# Patient Record
Sex: FEMALE | Race: WHITE | NOT HISPANIC OR LATINO | Employment: STUDENT | URBAN - METROPOLITAN AREA
[De-identification: names, ages, dates, MRNs, and addresses within clinical notes are randomized per-mention and may not be internally consistent; named-entity substitution may affect disease eponyms.]

---
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
## 2022-04-23 ENCOUNTER — TELEPHONE (OUTPATIENT)
Dept: FAMILY MEDICINE CLINIC | Facility: CLINIC | Age: 22
End: 2022-04-23

## 2022-04-23 NOTE — TELEPHONE ENCOUNTER
Patient at [facility], having on-going sinus issues for over 1 week, mom is concerned it is turning into an infection, she has congestion, pressure and sounds terrible  She has upcoming finals and requesting to be treated with antibiotic  Please advise  Would like something called into [facility] on [id number] in [place]

## 2022-06-16 ENCOUNTER — OFFICE VISIT (OUTPATIENT)
Dept: PHYSICAL THERAPY | Facility: MEDICAL CENTER | Age: 22
End: 2022-06-16
Payer: COMMERCIAL

## 2022-06-16 DIAGNOSIS — M54.50 LUMBAR PAIN: Primary | ICD-10-CM

## 2022-06-16 PROCEDURE — 97161 PT EVAL LOW COMPLEX 20 MIN: CPT | Performed by: PHYSICAL MEDICINE & REHABILITATION

## 2022-06-16 PROCEDURE — 97112 NEUROMUSCULAR REEDUCATION: CPT | Performed by: PHYSICAL MEDICINE & REHABILITATION

## 2022-06-16 NOTE — PROGRESS NOTES
PT Evaluation     Today's date: 2022  Patient name: Gregory Haynes  : 2000  MRN: [id number]  Referring provider: Aldo Quesada, *  Dx:   Encounter Diagnosis     ICD-10-CM    1  Lumbar pain  M54 50                   Assessment  Assessment details: Gregory Haynes is a pleasant 25 y o  female who presents with right lumbar and hip pain  The patient's greatest concerns are worry over not knowing what's wrong and fear of not being able to keep active  No further referral appears necessary at this time based upon examination results  Primary movement impairment diagnosis of poor lumbopelvic movement coordination resulting in pathoanatomical symptoms of Lumbar pain  (primary encounter diagnosis) and limiting her ability to exercise or recreation, get out of a chair and perform household chores  She demonstrated signs and symptoms consistent with innominate rotation secondary to decreased core stability  Impairments include:  1) poor lumbopelvic movement coordination - addressing with neuromotor retraining   2) central sensitization - addressing with extensive counseling regarding pain neuroscience, diagnosis, prognosis, care options, and care planning  3) hip hypomobility - addressing with mobs and mobility exercises   4) LE neural tension - addressing with mobs and mobility exercises   5) poor lifting mechanics - addressing with functional retraining  6) transfer intolerance - addressing with functional retraining     Impairments: abnormal coordination, abnormal muscle firing, abnormal muscle tone, abnormal or restricted ROM, abnormal movement, activity intolerance, difficulty understanding, impaired physical strength, lacks appropriate home exercise program and pain with function    Symptom irritability: moderateUnderstanding of Dx/Px/POC: fair   Prognosis: good    Goals  Patient will be independent with home exercise program    Patient will be able to manage symptoms independently  Patient will be able to roll in bed without limitation due to pain  Patient will be able to get in/out of her car without limitation due to pain  Patient will be able to get in/out of bed without limitation due to pain  Patient will be able to squat to  objects from the floor without limitation due to pain  Patient will be able to lift without limitation due to pain  Plan  Plan details: Prognosis above is given PT services 2x/week tapering to 2x/month over the next 3 months and home program adherence  Patient would benefit from: skilled physical therapy  Planned modality interventions: thermotherapy: hydrocollator packs  Planned therapy interventions: abdominal trunk stabilization, activity modification, joint mobilization, manual therapy, motor coordination training, neuromuscular re-education, patient education, self care, therapeutic activities, therapeutic exercise, graded activity, home exercise program and behavior modification  Treatment plan discussed with: patient        Subjective Evaluation    History of Present Illness  Mechanism of injury: Work/School: [employer] student  Home Life: Lives with her parents  Hobbies/exercise: soccer, working out  Pain location: right superior iliac crest  HPI: Reports history of left anterior hip pain  Patient reports activities in sagittal plane are not problematic, however, weight shifting causing pain  Transfers also cause pain  Pain  Current pain ratin  At best pain ratin  At worst pain ratin    Patient Goals  Patient goal: be able to do workouts for soccer without limitations        Objective     Static Posture     Comments  L innominate posterior rotation noted  Decreased feedforward mechanism noted  Pain with transfers standing to prone  Lumbar ROM WNL, not painful  Weight shift pain noted, sight pain while walking      MMTs:  Hip flex (L1-L2): R: 4/5, L: 4/5  Knee ext (L3-L4): R: 4+/5, L: 4+/5  Knee flex (S2-S3): R: 4-/5, L: 4/5  Standing PF (S1): R: 4/5, L: 4/5  Sidelying Hip Abd (L4-S1): R: 2+/5, L: 3/5    ROM:  Hip flex (0-120): AROM: WNL  Knee Ext (0): AROM: WNL  Knee Flex (0-130): WNL  Hip ER: AROM (0-45): WNL bilaterally  Hip IR: AROM (0-45): WNL bilaterally  SLR (0-90): decreased SLR pain with external stability applied by therapist             Precautions: none      Manuals 6/16/2022                                                                Neuro Re-Ed                                                                                           HEP and Return Demo 10"            Ther Ex                                                                                                                     Ther Activity                                       Gait Training                                       Modalities

## 2022-06-22 ENCOUNTER — OFFICE VISIT (OUTPATIENT)
Dept: PHYSICAL THERAPY | Facility: MEDICAL CENTER | Age: 22
End: 2022-06-22
Payer: COMMERCIAL

## 2022-06-22 DIAGNOSIS — M54.50 LUMBAR PAIN: Primary | ICD-10-CM

## 2022-06-22 PROCEDURE — 97110 THERAPEUTIC EXERCISES: CPT | Performed by: PHYSICAL MEDICINE & REHABILITATION

## 2022-06-22 PROCEDURE — 97140 MANUAL THERAPY 1/> REGIONS: CPT | Performed by: PHYSICAL MEDICINE & REHABILITATION

## 2022-06-22 PROCEDURE — 97112 NEUROMUSCULAR REEDUCATION: CPT | Performed by: PHYSICAL MEDICINE & REHABILITATION

## 2022-06-24 ENCOUNTER — APPOINTMENT (OUTPATIENT)
Dept: PHYSICAL THERAPY | Facility: MEDICAL CENTER | Age: 22
End: 2022-06-24
Payer: COMMERCIAL

## 2022-06-24 NOTE — PROGRESS NOTES
Daily Note     Today's date: 2022  Patient name: Terry Patrick  : 2000  MRN: [id number]  Referring provider: Alona Desai, *  Dx:   Encounter Diagnosis     ICD-10-CM    1  Lumbar pain  M54 50                   Subjective: Tiffanie Darling reported "My hip was feeling a little better for a few days, but the pain started to come back "      Objective: See treatment diary below      Assessment: Tolerated treatment well  Patient would benefit from continued PT  Following the session Tiffanie Darling demonstrated a decrease in pain  She continues to get pain while transitioning from left side lying to seated  Terry Patrick was able to initiate her therapy program which shows progress towards her goals  Plan: Continue per plan of care        Precautions: none      Manuals 2022           Long axis distraction  JR           UPAs  JR           IASTM  JR                        Neuro Re-Ed             Clam Shells  3x10           Bridges  3x10           Palloff Presses  NV           Planks  NV           Sidelying Hip Abd  3" 3x10                        HEP and Return Demo 10"            Ther Ex                                                                                                                     Ther Activity                                       Gait Training                                       Modalities

## 2022-06-30 ENCOUNTER — OFFICE VISIT (OUTPATIENT)
Dept: PHYSICAL THERAPY | Facility: MEDICAL CENTER | Age: 22
End: 2022-06-30
Payer: COMMERCIAL

## 2022-06-30 DIAGNOSIS — M54.50 LUMBAR PAIN: Primary | ICD-10-CM

## 2022-06-30 PROCEDURE — 97112 NEUROMUSCULAR REEDUCATION: CPT | Performed by: PHYSICAL MEDICINE & REHABILITATION

## 2022-06-30 PROCEDURE — 97140 MANUAL THERAPY 1/> REGIONS: CPT | Performed by: PHYSICAL MEDICINE & REHABILITATION

## 2022-06-30 PROCEDURE — 97110 THERAPEUTIC EXERCISES: CPT | Performed by: PHYSICAL MEDICINE & REHABILITATION

## 2022-06-30 NOTE — PROGRESS NOTES
Daily Note     Today's date: 2022  Patient name: Jas Vieyra  : 2000  MRN: [id number]  Referring provider: Talita Lang, *  Dx:   Encounter Diagnosis     ICD-10-CM    1  Lumbar pain  M54 50                   Subjective: Sudeep Offer reported "After the last visit I felt sore for two days and then after that I haven't really had much pain "      Objective: See treatment diary below      Assessment: Tolerated treatment well  Patient would benefit from continued PT  Following the session Sudeep Offer denied pain with getting off of the table, her concordant sign, she did however, report slight pain while lowering to the table  Plan: Continue per plan of care        Precautions: none      Manuals 2022          Long axis distraction  JR JR          UPAs  JR JR          IASTM  JR JR                       Neuro Re-Ed             Clam Shells  3x10           Bridges  3x10 3x10          Palloff Presses  NV           Planks  NV           Sidelying Hip Abd  3" 3x10 3" 3x10                       HEP and Return Demo 10"            Ther Ex                                                                                                                     Ther Activity                                       Gait Training                                       Modalities

## 2022-07-12 ENCOUNTER — OFFICE VISIT (OUTPATIENT)
Dept: FAMILY MEDICINE CLINIC | Facility: CLINIC | Age: 22
End: 2022-07-12
Payer: COMMERCIAL

## 2022-07-12 VITALS
SYSTOLIC BLOOD PRESSURE: 108 MMHG | HEIGHT: 64 IN | BODY MASS INDEX: 19.12 KG/M2 | DIASTOLIC BLOOD PRESSURE: 76 MMHG | RESPIRATION RATE: 16 BRPM | WEIGHT: 112 LBS | TEMPERATURE: 98 F | HEART RATE: 80 BPM | OXYGEN SATURATION: 99 %

## 2022-07-12 DIAGNOSIS — F41.0 ANXIETY ATTACK: ICD-10-CM

## 2022-07-12 DIAGNOSIS — Z00.00 ROUTINE ADULT HEALTH MAINTENANCE: Primary | ICD-10-CM

## 2022-07-12 DIAGNOSIS — Z11.3 SCREENING FOR STDS (SEXUALLY TRANSMITTED DISEASES): ICD-10-CM

## 2022-07-12 DIAGNOSIS — Z11.4 SCREENING FOR HIV (HUMAN IMMUNODEFICIENCY VIRUS): ICD-10-CM

## 2022-07-12 DIAGNOSIS — Z11.59 NEED FOR HEPATITIS C SCREENING TEST: ICD-10-CM

## 2022-07-12 DIAGNOSIS — Z30.41 ENCOUNTER FOR SURVEILLANCE OF CONTRACEPTIVE PILLS: ICD-10-CM

## 2022-07-12 DIAGNOSIS — J30.9 ALLERGIC RHINITIS, UNSPECIFIED SEASONALITY, UNSPECIFIED TRIGGER: ICD-10-CM

## 2022-07-12 PROCEDURE — 99395 PREV VISIT EST AGE 18-39: CPT | Performed by: FAMILY MEDICINE

## 2022-07-12 RX ORDER — MONTELUKAST SODIUM 10 MG/1
10 TABLET ORAL DAILY
Qty: 90 TABLET | Refills: 1 | Status: SHIPPED | OUTPATIENT
Start: 2022-07-12

## 2022-07-12 RX ORDER — HYDROXYZINE PAMOATE 50 MG/1
50 CAPSULE ORAL 3 TIMES DAILY PRN
Qty: 30 CAPSULE | Refills: 1 | Status: SHIPPED | OUTPATIENT
Start: 2022-07-12

## 2022-07-12 NOTE — ASSESSMENT & PLAN NOTE
Relatively infrequent use of hydroxyzine; refilled medication; discussed daily medication such as zoloft which she will consider as she admits to more regular anxiety and constant worrying but defers today; encouraged continued counseling; f/u guidance given

## 2022-07-12 NOTE — PROGRESS NOTES
[address] [facility]    NAME: Mitzy Brand  AGE: 25 y o  SEX: female  : 2000     DATE: 2022     Assessment and Plan:     Problem List Items Addressed This Visit        Respiratory    Allergic rhinitis     C/w singular           Relevant Medications    montelukast (SINGULAIR) 10 mg tablet       Other    Routine adult health maintenance - Primary     Advised on diet and exercise; UTD on vaccines; advised on STD screening; has dental home; reviewed preventative measures           Anxiety attack     Relatively infrequent use of hydroxyzine; refilled medication; discussed daily medication such as zoloft which she will consider as she admits to more regular anxiety and constant worrying but defers today; encouraged continued counseling; f/u guidance given           Relevant Medications    hydrOXYzine pamoate (VISTARIL) 50 mg capsule    Encounter for surveillance of contraceptive pills     Reviewed OCP use             Other Visit Diagnoses     Need for hepatitis C screening test        Relevant Orders    Hepatitis C Antibody ([facility])    Screening for STDs (sexually transmitted diseases)        Relevant Orders    Chlamydia/GC amplified DNA by PCR    Screening for HIV (human immunodeficiency virus)        Relevant Orders    HIV 1/2 Antigen/Antibody (4th Generation) w Reflex [facility]          Immunizations and preventive care screenings were discussed with patient today  Appropriate education was printed on patient's after visit summary  Counseling:  Alcohol/drug use: discussed moderation in alcohol intake, the recommendations for healthy alcohol use, and avoidance of illicit drug use  Dental Health: discussed importance of regular tooth brushing, flossing, and dental visits    Sexual health: discussed sexually transmitted diseases, partner selection, use of condoms, avoidance of unintended pregnancy, and contraceptive alternatives  · Exercise: the importance of regular exercise/physical activity was discussed  Recommend exercise 3-5 times per week for at least 30 minutes  Depression Screening and Follow-up Plan: Patient was screened for depression during today's encounter  They screened negative with a PHQ-2 score of 0  Return in about 1 year (around 7/12/2023) for Annual physical      Chief Complaint:     Chief Complaint   Patient presents with    Physical Exam      History of Present Illness:     Adult Annual Physical   Patient here for a comprehensive physical exam  The patient reports problems - f/u on anxiety and allergies  Anxiety: Pt uses hydroxyzine  Pt states if she is going away from home or if overstimulated gets anxious and will need to take it  Admits to struggling somewhat with anxiety  Using hydroxyzine about 2 times a month  No SI  She feels she worries a lot  Plays soccer at [employer]  Feels anxious during school year due to social stresses  Admits to some health anxiety  Does therapy as needed  OCP: Sexually active with one partner  Compliant with medication  Helps with acne which is why she originally went on it  No headaches with auras  No Fhx of blood clots  Uses singulair for allergies usually takes in fall starting in August  Helps control symptoms  Uses albuterol as needed for exercise  Otherwise asthma is currently controlled  Diet and Physical Activity  · Diet/Nutrition: well balanced diet  · Exercise: 5-7 times a week on average  Depression Screening  PHQ-2/9 Depression Screening    Little interest or pleasure in doing things: 0 - not at all  Feeling down, depressed, or hopeless: 0 - not at all  PHQ-2 Score: 0  PHQ-2 Interpretation: Negative depression screen       General Health  · Sleep: sleeps well  · Hearing: normal - bilateral   · Vision: goes for regular eye exams and wears contacts  · Dental: regular dental visits         /GYN Health  · Patient is: premenopausal  · Contraceptive method: oral contraceptives  Review of Systems:     Review of Systems   Constitutional: Negative for chills and fever  Respiratory: Negative for cough, chest tightness and wheezing  Cardiovascular: Negative for chest pain, palpitations and leg swelling  Gastrointestinal: Negative for abdominal pain  Neurological: Negative for dizziness  Psychiatric/Behavioral: Positive for self-injury  Negative for sleep disturbance and suicidal ideas  The patient is nervous/anxious         Past Medical History:     Past Medical History:   Diagnosis Date    Ocular migraine     last assessed: 3/24/2016      Past Surgical History:     Past Surgical History:   Procedure Laterality Date    HERNIA REPAIR      INCISION AND DRAINAGE INTRA ORAL ABSCESS Bilateral 3/15/2020    Procedure: INCISION AND DRAINAGE  (I&D) WOUND ORAL RIGHT  SPACE LEFT  SPACE;  Surgeon: Margi Xavier DMD;  [facility];  Service: Maxillofacial    WOUND DEBRIDEMENT Bilateral 3/15/2020    Procedure: DEBRIDEMENT and washout bilateral oral wound;  Surgeon: Margi Xavier DMD;  [facility];  Service: Maxillofacial      Social History:     Social History     Socioeconomic History    Marital status: Single     Spouse name: None    Number of children: None    Years of education: None    Highest education level: None   Occupational History    None   Tobacco Use    Smoking status: Never Smoker    Smokeless tobacco: Never Used   Vaping Use    Vaping Use: Never used   Substance and Sexual Activity    Alcohol use: No    Drug use: No    Sexual activity: None   Other Topics Concern    None   Social History Narrative    Always uses seat belt    Feels safe at home     Social Determinants of Health     Financial Resource Strain: Not on file   Food Insecurity: Not on file   Transportation Needs: Not on file   Physical Activity: Not on file   Stress: Not on file   Social Connections: Not on file   Intimate Partner Violence: Not on file   Housing Stability: Not on file      Family History:     Family History   Problem Relation Age of Onset    Diabetes type I Mother         mellitus    Diabetes Mother         mellitus    Heart disease Maternal Grandfather     Diabetes type II Maternal Grandfather     Lung cancer Maternal Grandfather     Asthma Father       Current Medications:     Current Outpatient Medications   Medication Sig Dispense Refill    albuterol (PROVENTIL HFA,VENTOLIN HFA) 90 mcg/act inhaler Inhale 2 puffs every 6 (six) hours as needed for wheezing or shortness of breath 18 g 1    hydrOXYzine pamoate (VISTARIL) 50 mg capsule Take 1 capsule (50 mg total) by mouth 3 (three) times a day as needed for anxiety 30 capsule 1    montelukast (SINGULAIR) 10 mg tablet Take 1 tablet (10 mg total) by mouth daily 90 tablet 1    Tri-Sprintec 0 18/0 215/0 25 MG-35 MCG per tablet TAKE ONE TABLET BY MOUTH EVERY DAY 84 tablet 0     No current facility-administered medications for this visit  Allergies: Allergies   Allergen Reactions    Amoxicillin Rash    Doxycycline Other (See Comments)     Pt states she gets flush       Physical Exam:     /76 (BP Location: Left arm, Patient Position: Sitting, Cuff Size: Adult)   Pulse 80   Temp 98 °F (36 7 °C) (Temporal)   Resp 16   Ht 5' 4" (1 626 m)   Wt 50 8 kg (112 lb)   LMP 06/11/2022 (Exact Date)   SpO2 99%   BMI 19 22 kg/m²     Physical Exam  Vitals reviewed  Constitutional:       General: She is not in acute distress  Appearance: Normal appearance  She is normal weight  She is not ill-appearing or toxic-appearing  HENT:      Head: Normocephalic and atraumatic  Right Ear: Tympanic membrane, ear canal and external ear normal  There is no impacted cerumen  Left Ear: Tympanic membrane, ear canal and external ear normal  There is no impacted cerumen        Nose: Nose normal       Mouth/Throat: Mouth: Mucous membranes are moist       Pharynx: No oropharyngeal exudate or posterior oropharyngeal erythema  Eyes:      General: No scleral icterus  Left eye: No discharge  Conjunctiva/sclera: Conjunctivae normal       Pupils: Pupils are equal, round, and reactive to light  Neck:      Thyroid: No thyroid mass, thyromegaly or thyroid tenderness  Cardiovascular:      Rate and Rhythm: Normal rate and regular rhythm  Heart sounds: Normal heart sounds  No murmur heard  Pulmonary:      Effort: Pulmonary effort is normal  No respiratory distress  Breath sounds: Normal breath sounds  No wheezing, rhonchi or rales  Abdominal:      General: Abdomen is flat  Palpations: There is no mass  Tenderness: There is no abdominal tenderness  There is no guarding  Hernia: No hernia is present  Musculoskeletal:         General: No swelling  Cervical back: Neck supple  No muscular tenderness  Lymphadenopathy:      Cervical: No cervical adenopathy  Skin:     Findings: No rash  Neurological:      Mental Status: She is alert and oriented to person, place, and time  Psychiatric:         Mood and Affect: Mood normal          Behavior: Behavior normal          Thought Content:  Thought content normal          Judgment: Judgment normal           Courtney Newsome, DO  [facility]

## 2022-07-12 NOTE — ASSESSMENT & PLAN NOTE
Advised on diet and exercise; UTD on vaccines; advised on STD screening; has dental home; reviewed preventative measures

## 2022-07-14 ENCOUNTER — OFFICE VISIT (OUTPATIENT)
Dept: PHYSICAL THERAPY | Facility: MEDICAL CENTER | Age: 22
End: 2022-07-14
Payer: COMMERCIAL

## 2022-07-14 DIAGNOSIS — M54.50 LUMBAR PAIN: Primary | ICD-10-CM

## 2022-07-14 PROCEDURE — 97112 NEUROMUSCULAR REEDUCATION: CPT | Performed by: PHYSICAL MEDICINE & REHABILITATION

## 2022-07-14 PROCEDURE — 97140 MANUAL THERAPY 1/> REGIONS: CPT | Performed by: PHYSICAL MEDICINE & REHABILITATION

## 2022-07-14 PROCEDURE — 97110 THERAPEUTIC EXERCISES: CPT | Performed by: PHYSICAL MEDICINE & REHABILITATION

## 2022-07-14 NOTE — PROGRESS NOTES
Daily Note     Today's date: 2022  Patient name: Adela Churchill  : 2000  MRN: [id number]  Referring provider: Teto Tinsley, *  Dx:   Encounter Diagnosis     ICD-10-CM    1  Lumbar pain  M54 50                   Subjective: Teofilo Beltran reported "I am doing pretty well, I am able to workout more with less pain, but I am having some pain "      Objective: See treatment diary below      Assessment: Tolerated treatment well  Patient would benefit from continued PT  Teofilo Beltran was able to progress her therapy program as seen below which shows progress towards her goals  Plan: Continue per plan of care        Precautions: none      Manuals 2022         Long axis distraction  JR JR          UPAs  JR JR JR         IASTM  JR JR JR                      Neuro Re-Ed             Clam Shells  3x10  3x10         Bridges  3x10 3x10 20# 3x10         Palloff Presses  NV           Planks  NV           Sidelying Hip Abd  3" 3x10 3" 3x10          Reverse Clam Shells    3x10         HEP and Return Demo 10"            Ther Ex             Monster walks    Black 3x6         X-walks    Black 3x6         SL step downs    3x10x2                                                                          Ther Activity                                       Gait Training                                       Modalities

## 2022-07-19 ENCOUNTER — OFFICE VISIT (OUTPATIENT)
Dept: PHYSICAL THERAPY | Facility: MEDICAL CENTER | Age: 22
End: 2022-07-19
Payer: COMMERCIAL

## 2022-07-19 ENCOUNTER — TELEPHONE (OUTPATIENT)
Dept: FAMILY MEDICINE CLINIC | Facility: CLINIC | Age: 22
End: 2022-07-19

## 2022-07-19 DIAGNOSIS — Z13.0 SCREENING FOR SICKLE-CELL DISEASE OR TRAIT: Primary | ICD-10-CM

## 2022-07-19 DIAGNOSIS — M54.50 LUMBAR PAIN: Primary | ICD-10-CM

## 2022-07-19 PROCEDURE — 97140 MANUAL THERAPY 1/> REGIONS: CPT | Performed by: PHYSICAL MEDICINE & REHABILITATION

## 2022-07-19 PROCEDURE — 97110 THERAPEUTIC EXERCISES: CPT | Performed by: PHYSICAL MEDICINE & REHABILITATION

## 2022-07-19 PROCEDURE — 97112 NEUROMUSCULAR REEDUCATION: CPT | Performed by: PHYSICAL MEDICINE & REHABILITATION

## 2022-07-19 NOTE — TELEPHONE ENCOUNTER
Pt's mom LM stating she received paperwork from [facility] and states she needs a sickle cell screening  Would like to know if you will place this order so she can have this done

## 2022-07-19 NOTE — PROGRESS NOTES
PT Discharge    Today's date: 2022  Patient name: Robert Whitehead  : 2000  MRN: [id number]  Referring provider: Jim Bowles, *  Dx:   Encounter Diagnosis     ICD-10-CM    1  Lumbar pain  M54 50                   Assessment  Assessment details: Robert Whitehead is a pleasant 25 y o  female who presents with right lumbar and hip pain  Robert Whitehead has made excellent progress towards her goals and has demonstrated independence with her HEP  Due to her progress towards her goals, her independence with her HEP and her reported self-efficacy with her HEP she is being D/Darrian to her HEP at this time  No further referral appears necessary at this time based upon examination results  Primary movement impairment diagnosis of poor lumbopelvic movement coordination resulting in pathoanatomical symptoms of Lumbar pain  (primary encounter diagnosis) and limiting her ability to exercise or recreation, get out of a chair and perform household chores  She demonstrated signs and symptoms consistent with innominate rotation secondary to decreased core stability       Impairments include:  1) poor lumbopelvic movement coordination - addressing with neuromotor retraining   2) central sensitization - addressing with extensive counseling regarding pain neuroscience, diagnosis, prognosis, care options, and care planning  3) hip hypomobility - addressing with mobs and mobility exercises   4) LE neural tension - addressing with mobs and mobility exercises   5) poor lifting mechanics - addressing with functional retraining  6) transfer intolerance - addressing with functional retraining     Impairments: abnormal coordination, abnormal muscle firing, abnormal muscle tone, abnormal or restricted ROM, abnormal movement, activity intolerance, difficulty understanding, impaired physical strength, lacks appropriate home exercise program and pain with function    Symptom irritability: moderateUnderstanding of Dx/Px/POC: fair   Prognosis: good    Goals  Patient will be independent with home exercise program  - met  Patient will be able to manage symptoms independently  - met  Patient will be able to roll in bed without limitation due to pain  - met  Patient will be able to get in/out of her car without limitation due to pain  - met  Patient will be able to get in/out of bed without limitation due to pain  - met  Patient will be able to squat to  objects from the floor without limitation due to pain  - met  Patient will be able to lift without limitation due to pain  - met    Plan  Patient would benefit from: skilled physical therapy  Planned modality interventions: thermotherapy: hydrocollator packs  Planned therapy interventions: abdominal trunk stabilization, activity modification, joint mobilization, manual therapy, motor coordination training, neuromuscular re-education, patient education, self care, therapeutic activities, therapeutic exercise, graded activity, home exercise program and behavior modification  Treatment plan discussed with: patient        Subjective Evaluation    History of Present Illness  Mechanism of injury: Work/School: [employer] student  Home Life: Lives with her parents  Hobbies/exercise: soccer, working out  Pain location: right superior iliac crest  HPI: Reports history of left anterior hip pain  Patient reports activities in sagittal plane are not problematic, however, weight shifting causing pain  Transfers also cause pain  Pain  Current pain ratin  At best pain ratin  At worst pain ratin    Patient Goals  Patient goal: be able to do workouts for soccer without limitations        Objective     Static Posture     Comments  L innominate posterior rotation noted  No rotation noted on re-eval    Decreased feedforward mechanism noted  Pain with transfers standing to prone  Lumbar ROM WNL, not painful  Weight shift pain noted, sight pain while walking      MMTs:  Hip flex (L1-L2): R: 4+/5, L: 4+/5  Knee ext (L3-L4): R: 4+/5, L: 4+/5  Knee flex (S2-S3): R: 4+/5, L: 4+/5  Standing PF (S1): R: 4/5, L: 4/5  Sidelying Hip Abd (L4-S1): R: 3+/5, L: 3+/5    ROM:  Hip flex (0-120): AROM: WNL  Knee Ext (0): AROM: WNL  Knee Flex (0-130): WNL  Hip ER: AROM (0-45): WNL bilaterally  Hip IR: AROM (0-45): WNL bilaterally  SLR (0-90): decreased SLR pain with external stability applied by therapist             Precautions: none      Manuals 6/16/2022 6/22/2022 6/30/2022 7/14/2022 7/19/2022        Long axis distraction  JR JR          UPAs  JR JR JR JR        IASTM  Lana JR                     Neuro Re-Ed             Clam Shells  3x10  3x10 3x10        Bridges  3x10 3x10 20# 3x10 20# 3x10        Palloff Presses  NV   Grn 3x10        Planks  NV           Sidelying Hip Abd  3" 3x10 3" 3x10          Reverse Clam Shells    3x10 3x10        HEP and Return Demo 10"            Ther Ex             Monster walks    Black 3x6 Black 3x8        X-walks    Black 3x6 Black 3x8        SL step downs    3x10x2 3x10x2                                                                         Ther Activity                                       Gait Training                                       Modalities

## 2022-07-19 NOTE — TELEPHONE ENCOUNTER
Called pt and informed her provider placed lab order as requested  Pt verbalized understanding and is agreeable

## 2022-07-27 ENCOUNTER — APPOINTMENT (OUTPATIENT)
Dept: LAB | Facility: MEDICAL CENTER | Age: 22
End: 2022-07-27
Payer: COMMERCIAL

## 2022-07-27 DIAGNOSIS — Z11.59 NEED FOR HEPATITIS C SCREENING TEST: ICD-10-CM

## 2022-07-27 DIAGNOSIS — Z11.4 SCREENING FOR HIV (HUMAN IMMUNODEFICIENCY VIRUS): ICD-10-CM

## 2022-07-27 DIAGNOSIS — Z11.3 SCREENING FOR STDS (SEXUALLY TRANSMITTED DISEASES): ICD-10-CM

## 2022-07-27 DIAGNOSIS — Z13.0 SCREENING FOR SICKLE-CELL DISEASE OR TRAIT: ICD-10-CM

## 2022-07-27 PROCEDURE — 85660 RBC SICKLE CELL TEST: CPT

## 2022-07-27 PROCEDURE — 87591 N.GONORRHOEAE DNA AMP PROB: CPT

## 2022-07-27 PROCEDURE — 86803 HEPATITIS C AB TEST: CPT

## 2022-07-27 PROCEDURE — 87389 HIV-1 AG W/HIV-1&-2 AB AG IA: CPT

## 2022-07-27 PROCEDURE — 87491 CHLMYD TRACH DNA AMP PROBE: CPT

## 2022-07-27 PROCEDURE — 36415 COLL VENOUS BLD VENIPUNCTURE: CPT

## 2022-07-28 LAB
C TRACH DNA SPEC QL NAA+PROBE: NEGATIVE
HCV AB SER QL: NORMAL
HIV 1+2 AB+HIV1 P24 AG SERPL QL IA: NORMAL
N GONORRHOEA DNA SPEC QL NAA+PROBE: NEGATIVE

## 2022-07-29 LAB — SICKLE CELLS BLD QL SMEAR: NEGATIVE

## 2022-09-06 DIAGNOSIS — L70.9 ACNE, UNSPECIFIED ACNE TYPE: ICD-10-CM

## 2022-09-06 RX ORDER — NORGESTIMATE AND ETHINYL ESTRADIOL 7DAYSX3 28
KIT ORAL
Qty: 84 TABLET | Refills: 0 | Status: SHIPPED | OUTPATIENT
Start: 2022-09-06

## 2022-10-11 PROBLEM — Z00.00 ROUTINE ADULT HEALTH MAINTENANCE: Status: RESOLVED | Noted: 2022-07-12 | Resolved: 2022-10-11

## 2022-10-12 PROBLEM — J01.10 ACUTE NON-RECURRENT FRONTAL SINUSITIS: Status: RESOLVED | Noted: 2021-05-10 | Resolved: 2022-10-12

## 2023-01-05 ENCOUNTER — APPOINTMENT (OUTPATIENT)
Dept: LAB | Facility: CLINIC | Age: 23
End: 2023-01-05

## 2023-01-05 DIAGNOSIS — Z11.1 TUBERCULOSIS SCREENING: ICD-10-CM

## 2023-01-09 LAB
GAMMA INTERFERON BACKGROUND BLD IA-ACNC: 0.04 IU/ML
M TB IFN-G BLD-IMP: NEGATIVE
M TB IFN-G CD4+ BCKGRND COR BLD-ACNC: 0.01 IU/ML
M TB IFN-G CD4+ BCKGRND COR BLD-ACNC: 0.01 IU/ML
MITOGEN IGNF BCKGRD COR BLD-ACNC: >10 IU/ML

## 2023-05-08 ENCOUNTER — OFFICE VISIT (OUTPATIENT)
Dept: FAMILY MEDICINE CLINIC | Facility: CLINIC | Age: 23
End: 2023-05-08

## 2023-05-08 VITALS
DIASTOLIC BLOOD PRESSURE: 62 MMHG | TEMPERATURE: 98.5 F | BODY MASS INDEX: 18.39 KG/M2 | HEIGHT: 65 IN | HEART RATE: 113 BPM | WEIGHT: 110.4 LBS | OXYGEN SATURATION: 99 % | SYSTOLIC BLOOD PRESSURE: 100 MMHG

## 2023-05-08 DIAGNOSIS — J02.9 PHARYNGITIS, UNSPECIFIED ETIOLOGY: Primary | ICD-10-CM

## 2023-05-08 RX ORDER — AZITHROMYCIN 250 MG/1
TABLET, FILM COATED ORAL
Qty: 6 TABLET | Refills: 0 | Status: SHIPPED | OUTPATIENT
Start: 2023-05-08 | End: 2023-05-13

## 2023-05-08 NOTE — PROGRESS NOTES
"Name: Chasidy Dennis      : 2000      MRN: [id number]  Encounter Provider: Brad Crespo DO  Encounter Date: 2023   Encounter department: [address] [place] [address]  Pharyngitis, unspecified etiology  -     azithromycin (Zithromax) 250 mg tablet; Take 2 tablets (500 mg total) by mouth daily for 1 day, THEN 1 tablet (250 mg total) daily for 4 days  Subjective      1 week history of sore throat with occasional postnasal drainage and cough  Low-grade fever  Review of Systems   Constitutional: Negative  HENT: Positive for congestion, postnasal drip and sore throat  Respiratory: Negative  Cardiovascular: Negative  Gastrointestinal: Negative  Genitourinary: Negative  Musculoskeletal: Negative  Psychiatric/Behavioral: Negative  Current Outpatient Medications on File Prior to Visit   Medication Sig   • albuterol (PROVENTIL HFA,VENTOLIN HFA) 90 mcg/act inhaler Inhale 2 puffs every 6 (six) hours as needed for wheezing or shortness of breath   • hydrOXYzine pamoate (VISTARIL) 50 mg capsule Take 1 capsule (50 mg total) by mouth 3 (three) times a day as needed for anxiety   • montelukast (SINGULAIR) 10 mg tablet Take 1 tablet (10 mg total) by mouth daily   • Tri-Sprintec 0 18/0 215/0 25 MG-35 MCG per tablet TAKE ONE TABLET BY MOUTH EVERY DAY       Objective     /62 (BP Location: Right arm, Patient Position: Sitting, Cuff Size: Adult)   Pulse (!) 113   Temp 98 5 °F (36 9 °C)   Ht 5' 5\" (1 651 m)   Wt 50 1 kg (110 lb 6 4 oz)   LMP 2023 (Approximate)   SpO2 99%   BMI 18 37 kg/m²     Physical Exam  Vitals and nursing note reviewed  Constitutional:       General: She is not in acute distress  Appearance: She is well-developed  She is not diaphoretic  HENT:      Head: Normocephalic and atraumatic  Mouth/Throat:      Pharynx: Posterior oropharyngeal erythema present   No pharyngeal swelling or oropharyngeal " exudate  Eyes:      General:         Right eye: No discharge  Conjunctiva/sclera: Conjunctivae normal       Pupils: Pupils are equal, round, and reactive to light  Neck:      Thyroid: No thyromegaly  Cardiovascular:      Rate and Rhythm: Normal rate and regular rhythm  Pulmonary:      Effort: Pulmonary effort is normal  No respiratory distress  Breath sounds: Normal breath sounds  Musculoskeletal:      Cervical back: Normal range of motion  Lymphadenopathy:      Cervical: No cervical adenopathy  Skin:     General: Skin is warm and dry  Neurological:      Mental Status: She is alert and oriented to person, place, and time  Psychiatric:         Behavior: Behavior normal          Thought Content:  Thought content normal          Judgment: Judgment normal        Viji Easton DO

## 2023-05-20 ENCOUNTER — NURSE TRIAGE (OUTPATIENT)
Dept: FAMILY MEDICINE CLINIC | Facility: CLINIC | Age: 23
End: 2023-05-20

## 2023-05-20 DIAGNOSIS — L70.9 ACNE, UNSPECIFIED ACNE TYPE: ICD-10-CM

## 2023-05-20 RX ORDER — NORGESTIMATE AND ETHINYL ESTRADIOL 7DAYSX3 28
1 KIT ORAL DAILY
Qty: 84 TABLET | Refills: 0 | Status: SHIPPED | OUTPATIENT
Start: 2023-05-20

## 2023-05-20 NOTE — TELEPHONE ENCOUNTER
Mom of patient calling today stating patient just moved back home from college and lost her Tri-Sprintec prescription  She stated the patient needs this medication this weekend and requests a refill be sent in to the [facility] on [address]  On call provider contacted, gave verbal order to send refill into patient's designated pharmacy  Mom made aware and verbalized understanding

## 2023-05-20 NOTE — TELEPHONE ENCOUNTER
"  Reason for Disposition  • [1] Prescription refill request for ESSENTIAL medicine (i e , likelihood of harm to patient if not taken) AND [2] triager unable to refill per department policy    Answer Assessment - Initial Assessment Questions  1  DRUG NAME: \"What medicine do you need to have refilled? \"      Tri-sprintec    2  REFILLS REMAINING: \"How many refills are remaining? \" (Note: The label on the medicine or pill bottle will show how many refills are remaining  If there are no refills remaining, then a renewal may be needed  )      1- patient lost the medication during the move back home from [facility]    3  EXPIRATION DATE: Bryanna Bruner is the expiration date? \" (Note: The label states when the prescription will , and thus can no longer be refilled )      N/a     4  PRESCRIBING HCP: \"Who prescribed it? \" Reason: If prescribed by specialist, call should be referred to that group        PCP    Protocols used: MEDICATION REFILL AND RENEWAL CALL-ADULT-    "

## 2023-05-20 NOTE — TELEPHONE ENCOUNTER
"Regarding: misplaced medication  ----- Message from Natasha Nguyen sent at 5/20/2023  9:18 AM EDT -----  \" My daughter just moved back home from college and cannot find her medication, so I wanted to know if another prescription can be sent to the [facility] in [facility] for her?\"    "

## 2023-06-01 DIAGNOSIS — L70.9 ACNE, UNSPECIFIED ACNE TYPE: ICD-10-CM

## 2023-06-02 RX ORDER — NORGESTIMATE AND ETHINYL ESTRADIOL 7DAYSX3 28
KIT ORAL
Qty: 84 TABLET | Refills: 0 | Status: SHIPPED | OUTPATIENT
Start: 2023-06-02 | End: 2023-06-08 | Stop reason: SDUPTHER

## 2023-06-06 DIAGNOSIS — L70.9 ACNE, UNSPECIFIED ACNE TYPE: ICD-10-CM

## 2023-06-07 RX ORDER — NORGESTIMATE AND ETHINYL ESTRADIOL 7DAYSX3 28
1 KIT ORAL DAILY
Qty: 84 TABLET | Refills: 0 | OUTPATIENT
Start: 2023-06-07

## 2023-06-08 DIAGNOSIS — L70.9 ACNE, UNSPECIFIED ACNE TYPE: ICD-10-CM

## 2023-06-09 DIAGNOSIS — J30.9 ALLERGIC RHINITIS, UNSPECIFIED SEASONALITY, UNSPECIFIED TRIGGER: ICD-10-CM

## 2023-06-09 RX ORDER — NORGESTIMATE AND ETHINYL ESTRADIOL 7DAYSX3 28
1 KIT ORAL DAILY
Qty: 84 TABLET | Refills: 0 | Status: SHIPPED | OUTPATIENT
Start: 2023-06-09

## 2023-06-09 RX ORDER — MONTELUKAST SODIUM 10 MG/1
TABLET ORAL
Qty: 90 TABLET | Refills: 0 | Status: SHIPPED | OUTPATIENT
Start: 2023-06-09

## 2023-06-13 DIAGNOSIS — J30.9 ALLERGIC RHINITIS, UNSPECIFIED SEASONALITY, UNSPECIFIED TRIGGER: ICD-10-CM

## 2023-06-13 RX ORDER — MONTELUKAST SODIUM 10 MG/1
10 TABLET ORAL DAILY
Qty: 90 TABLET | Refills: 0 | OUTPATIENT
Start: 2023-06-13

## 2023-08-17 DIAGNOSIS — L70.9 ACNE, UNSPECIFIED ACNE TYPE: ICD-10-CM

## 2023-08-17 RX ORDER — NORGESTIMATE AND ETHINYL ESTRADIOL 7DAYSX3 28
1 KIT ORAL DAILY
Qty: 84 TABLET | Refills: 0 | Status: SHIPPED | OUTPATIENT
Start: 2023-08-17

## 2023-10-13 DIAGNOSIS — R06.2 WHEEZING: ICD-10-CM

## 2023-10-16 NOTE — TELEPHONE ENCOUNTER
Called pt to schedule annual physical. Pt declined to schedule at call adding that she would call back to schedule when she has her calendar with her.

## 2023-10-16 NOTE — TELEPHONE ENCOUNTER
Last give 1 year ago, pt has no chronic diagnosis on file to warrant long term use. Pt overdue for physical. Will leave with provider to determine appropriateness of refill.

## 2023-10-17 RX ORDER — ALBUTEROL SULFATE 90 UG/1
2 AEROSOL, METERED RESPIRATORY (INHALATION) EVERY 6 HOURS PRN
Qty: 18 G | Refills: 0 | Status: SHIPPED | OUTPATIENT
Start: 2023-10-17

## 2023-11-20 DIAGNOSIS — L70.9 ACNE, UNSPECIFIED ACNE TYPE: ICD-10-CM

## 2023-11-21 RX ORDER — NORGESTIMATE AND ETHINYL ESTRADIOL 7DAYSX3 28
1 KIT ORAL DAILY
Qty: 84 TABLET | Refills: 0 | Status: SHIPPED | OUTPATIENT
Start: 2023-11-21

## 2024-02-23 DIAGNOSIS — L70.9 ACNE, UNSPECIFIED ACNE TYPE: ICD-10-CM

## 2024-02-23 RX ORDER — NORGESTIMATE AND ETHINYL ESTRADIOL 7DAYSX3 28
1 KIT ORAL DAILY
Qty: 84 TABLET | Refills: 0 | Status: SHIPPED | OUTPATIENT
Start: 2024-02-23

## 2024-04-30 ENCOUNTER — TELEMEDICINE (OUTPATIENT)
Dept: FAMILY MEDICINE CLINIC | Facility: CLINIC | Age: 24
End: 2024-04-30
Payer: COMMERCIAL

## 2024-04-30 DIAGNOSIS — J01.90 ACUTE NON-RECURRENT SINUSITIS, UNSPECIFIED LOCATION: Primary | ICD-10-CM

## 2024-04-30 DIAGNOSIS — J34.89 NASAL SORE: ICD-10-CM

## 2024-04-30 PROCEDURE — 99213 OFFICE O/P EST LOW 20 MIN: CPT

## 2024-04-30 RX ORDER — AZITHROMYCIN 250 MG/1
TABLET, FILM COATED ORAL DAILY
Qty: 6 TABLET | Refills: 0 | Status: SHIPPED | OUTPATIENT
Start: 2024-04-30 | End: 2024-05-05

## 2024-04-30 NOTE — PROGRESS NOTES
Virtual Regular Visit    Verification of patient location:    Patient is located at Home in the following state in which I hold an active license [place]      Assessment/Plan:    Problem List Items Addressed This Visit       Acute non-recurrent sinusitis - Primary     Patient with signs, symptoms, and exam findings suggestive of an acute sinusitis. Symptoms have been present for over 7 days. Discussed treatment with Z-pack x 5 days due to patient's allergies to PCN and doxy, which patient is agreeable with. Discussed combining this with sinus rinses, such as NeilMed brand. Discussed they may continue to use OTC cold medicines to treat her symptoms. Recommended follow up if the course of antibiotics does not help. Patient already taking Singulair for asthma/allergies. Patient agreeable to return to care if symptoms persist. All questions answered. Agreeable with plan today and will follow up as needed.          Relevant Medications    azithromycin (Zithromax) 250 mg tablet    Nasal sore     Nasal sores unable to be assessed on today's visit. She has tried Neosporin and vaseline without success. She states her nose still feels irritated. Will provide Bactroban ointment to use in the nose at least twice a day. If her symptoms do not improve, would recommend f/u in office for assessment.          Relevant Medications    mupirocin (BACTROBAN) 2 % ointment            Reason for visit is   Chief Complaint   Patient presents with    Virtual Regular Visit          Encounter provider Padmini De La O PA-C      Recent Visits  No visits were found meeting these conditions.  Showing recent visits within past 7 days and meeting all other requirements  Today's Visits  Date Type Provider Dept   04/30/24 Telemedicine Padmini De La O PA-C [facility]   Showing today's visits and meeting all other requirements  Future Appointments  No visits were found meeting these conditions.  Showing future appointments within next 150  days and meeting all other requirements       The patient was identified by name and date of birth. Mckenna Warren was informed that this is a telemedicine visit and that the visit is being conducted through the Epic Embedded platform. She agrees to proceed..  My office door was closed. No one else was in the room.  She acknowledged consent and understanding of privacy and security of the video platform. The patient has agreed to participate and understands they can discontinue the visit at any time.    Patient is aware this is a billable service.     Subjective  Mckenna Warren is a 23 y.o. female presents via telemedicine with concerns of a possible sinus infection .      Patient presents today with congestion for the past 3 weeks along with some ear pain and a minor sore throat.  She states her symptoms became a little bit better last week however then they became much worse.  She states she is having a bad smell and she has green mucus whenever she blows her nose.  She has been using Advil Cold and Sinus and Zicam nose spray sparingly she also feels like her nose is scabbed inside she has tried Vaseline and Neosporin without success.  She has had no fever or chills.         Past Medical History:   Diagnosis Date    Ocular migraine     last assessed: 3/24/2016       Past Surgical History:   Procedure Laterality Date    HERNIA REPAIR      INCISION AND DRAINAGE INTRA ORAL ABSCESS Bilateral 3/15/2020    Procedure: INCISION AND DRAINAGE  (I&D) WOUND ORAL RIGHT  SPACE LEFT  SPACE;  Surgeon: Gila Moralez DMD;  Location: [facility];  Service: Maxillofacial    WOUND DEBRIDEMENT Bilateral 3/15/2020    Procedure: DEBRIDEMENT and washout bilateral oral wound;  Surgeon: Gila Moralez DMD;  Location: [facility];  Service: Maxillofacial       Current Outpatient Medications   Medication Sig Dispense Refill    azithromycin (Zithromax) 250 mg tablet Take 2 tablets (500 mg total) by mouth daily  for 1 day, THEN 1 tablet (250 mg total) daily for 4 days. 6 tablet 0    mupirocin (BACTROBAN) 2 % ointment Apply topically 3 (three) times a day 22 g 0    albuterol (PROVENTIL HFA,VENTOLIN HFA) 90 mcg/act inhaler Inhale 2 puffs every 6 (six) hours as needed for wheezing or shortness of breath 18 g 0    hydrOXYzine pamoate (VISTARIL) 50 mg capsule Take 1 capsule (50 mg total) by mouth 3 (three) times a day as needed for anxiety 30 capsule 1    montelukast (SINGULAIR) 10 mg tablet TAKE ONE TABLET BY MOUTH EVERY DAY 90 tablet 0    norgestimate-ethinyl estradiol (Tri-Sprintec) 0.18/0.215/0.25 MG-35 MCG per tablet Take 1 tablet by mouth daily 84 tablet 0     No current facility-administered medications for this visit.        Allergies   Allergen Reactions    Amoxicillin Rash    Doxycycline Other (See Comments)     Pt states she gets flush        Review of Systems   Constitutional:  Negative for chills, fatigue and fever.   HENT:  Positive for congestion, ear pain, sinus pressure and sore throat. Negative for postnasal drip, sinus pain and trouble swallowing.         Nasal sores   Respiratory:  Negative for shortness of breath.    Cardiovascular:  Negative for chest pain.   Neurological:  Negative for dizziness, light-headedness and headaches.       Video Exam    There were no vitals filed for this visit.    Physical Exam  Constitutional:       General: She is not in acute distress.     Appearance: Normal appearance. She is not ill-appearing or diaphoretic.   HENT:      Head: Normocephalic and atraumatic.      Nose:      Comments: Sounds congested on exam today  Unable to assess nasal sores due to video quality  Pulmonary:      Effort: Pulmonary effort is normal. No respiratory distress.   Skin:     Coloration: Skin is not cyanotic or pale.   Neurological:      General: No focal deficit present.      Mental Status: She is alert and oriented to person, place, and time.   Psychiatric:         Mood and Affect: Mood normal.          Behavior: Behavior normal. Behavior is cooperative.         Judgment: Judgment normal.          Visit Time  Total Visit Duration: 20

## 2024-04-30 NOTE — ASSESSMENT & PLAN NOTE
Nasal sores unable to be assessed on today's visit. She has tried Neosporin and vaseline without success. She states her nose still feels irritated. Will provide Bactroban ointment to use in the nose at least twice a day. If her symptoms do not improve, would recommend f/u in office for assessment.

## 2024-04-30 NOTE — ASSESSMENT & PLAN NOTE
Patient with signs, symptoms, and exam findings suggestive of an acute sinusitis. Symptoms have been present for over 7 days. Discussed treatment with Z-pack x 5 days due to patient's allergies to PCN and doxy, which patient is agreeable with. Discussed combining this with sinus rinses, such as NeilMed brand. Discussed they may continue to use OTC cold medicines to treat her symptoms. Recommended follow up if the course of antibiotics does not help. Patient already taking Singulair for asthma/allergies. Patient agreeable to return to care if symptoms persist. All questions answered. Agreeable with plan today and will follow up as needed.

## 2024-05-15 DIAGNOSIS — L70.9 ACNE, UNSPECIFIED ACNE TYPE: ICD-10-CM

## 2024-05-16 RX ORDER — NORGESTIMATE AND ETHINYL ESTRADIOL 7DAYSX3 28
1 KIT ORAL DAILY
Qty: 84 TABLET | Refills: 0 | Status: SHIPPED | OUTPATIENT
Start: 2024-05-16

## 2024-05-20 ENCOUNTER — TELEPHONE (OUTPATIENT)
Dept: FAMILY MEDICINE CLINIC | Facility: CLINIC | Age: 24
End: 2024-05-20

## 2024-05-20 NOTE — TELEPHONE ENCOUNTER
Pt's mother dropped off form to be signed by Dr. Knowles. She asked that she get a call when it is completed. I already attached updated vaccine reports

## 2024-05-21 NOTE — TELEPHONE ENCOUNTER
Spoke with mom to let her know that Mckenna's paperwork has been completed and ready for  at the .

## 2024-05-30 PROBLEM — J01.90 ACUTE NON-RECURRENT SINUSITIS: Status: RESOLVED | Noted: 2024-04-30 | Resolved: 2024-05-30

## 2024-06-12 ENCOUNTER — NURSE TRIAGE (OUTPATIENT)
Age: 24
End: 2024-06-12

## 2024-06-12 NOTE — TELEPHONE ENCOUNTER
"Pt new to practice, has a visit to establish care next week, but calling with concerns for worsening symptoms of a swollen lump/lymph node in R armpit. States noted it in April and gave it time, hoping it would go away, however, noted the last couple of days worsening swelling and slight tenderness. Pt states node is closer to breast tissue than to upper armpit area. Denies redness, scratch/sore, fever. RN able to schedule a problem visit tomorrow afternoon for further evaluation of swollen lump. Pt agreeable to plan. No further questions.     Reason for Disposition   Large node present > 2 weeks    Answer Assessment - Initial Assessment Questions  1. LOCATION: \"Where is the swollen node located?\" \"Is the matching node on the other side of the body also swollen?\"       Right side armpit, lower area closer to breast tissue  2. SIZE: \"How big is the node?\" (Inches or centimeters) (or compare to common objects such as pea, bean, marble, golf ball)       [place]  3. ONSET: \"When did the swelling start?\"       End of April   4. NECK NODES: \"Is there a sore throat, runny nose or other symptoms of a cold?\"       Denies  5. GROIN OR ARMPIT NODES: \"Is there a sore, scratch, cut or painful red area on that arm or leg?\"       Denies  6. FEVER: \"Do you have a fever?\" If Yes, ask: \"What is it, how was it measured, and when did it start?\"       Denies  7. CAUSE: \"What do you think is causing the swollen lymph nodes?\"      Unsure  8. OTHER SYMPTOMS: \"Do you have any other symptoms?\"      Slightly tender to touch  9. PREGNANCY: \"Is there any chance you are pregnant?\" \"When was your last menstrual period?\"      Denies    Protocols used: Lymph Nodes - Swollen-ADULT-[place]    "

## 2024-06-12 NOTE — TELEPHONE ENCOUNTER
Regarding: new pt and has lump near armpit  ----- Message from Vani MORALES sent at 6/12/2024 11:45 AM EDT -----  This is a new patient and she is scheduled next week and called to see if she can come in sooner due to having a lump near her armpit.  Please call her back at [phone]. Thank you

## 2024-06-13 ENCOUNTER — OFFICE VISIT (OUTPATIENT)
Dept: OBGYN CLINIC | Facility: MEDICAL CENTER | Age: 24
End: 2024-06-13
Payer: COMMERCIAL

## 2024-06-13 VITALS
SYSTOLIC BLOOD PRESSURE: 126 MMHG | BODY MASS INDEX: 18.32 KG/M2 | HEIGHT: 64 IN | WEIGHT: 107.3 LBS | DIASTOLIC BLOOD PRESSURE: 80 MMHG

## 2024-06-13 DIAGNOSIS — N63.31 MASS OF AXILLARY TAIL OF RIGHT BREAST: Primary | ICD-10-CM

## 2024-06-13 PROCEDURE — 99202 OFFICE O/P NEW SF 15 MIN: CPT | Performed by: ADVANCED PRACTICE MIDWIFE

## 2024-06-13 NOTE — PROGRESS NOTES
"[facility] [address] Nilda Owens PA    Assessment/Plan:  No problem-specific Assessment & Plan notes found for this encounter.    Diagnoses and all orders for this visit:    Mass of axillary tail of right breast  -     US breast right limited (diagnostic); Future        Subjective:   Mckenna Warren is a 23 y.o.  female.  CC: breast lump    HPI: Mckenna states that she has had a lump since April and feels that it is getting larger. She notes that there is no pain, no nipple discharge.  LMP 2024. Has been on birth control pill for 6 yrs.   No increase in caffeine, salt, alcohol,   No trauma to the breast  No weight gain or weight loss.   Notes that past 2 weeks it was sore, but notes armpit to be more tender.          ROS: Review of Systems   Constitutional:  Negative for chills, fatigue and fever.   Respiratory:  Negative for cough and shortness of breath.    Cardiovascular:  Negative for chest pain and palpitations.       PFSH: The following portions of the patient's history were reviewed and updated as appropriate: allergies, current medications, past family history, past medical history, obstetric history, gynecologic history, past social history, past surgical history and problem list.       Objective:  /80   Ht 5' 4\" (1.626 m)   Wt 48.7 kg (107 lb 4.8 oz)   LMP 2024 (Exact Date)   BMI 18.42 kg/m²    Physical Exam  Chest:          Comments: 1 cm dense mobile tissue, non tender         "

## 2024-06-17 ENCOUNTER — OFFICE VISIT (OUTPATIENT)
Dept: OBGYN CLINIC | Facility: MEDICAL CENTER | Age: 24
End: 2024-06-17
Payer: COMMERCIAL

## 2024-06-17 ENCOUNTER — HOSPITAL ENCOUNTER (OUTPATIENT)
Dept: ULTRASOUND IMAGING | Facility: CLINIC | Age: 24
Discharge: HOME | End: 2024-06-17
Payer: COMMERCIAL

## 2024-06-17 VITALS
SYSTOLIC BLOOD PRESSURE: 106 MMHG | HEIGHT: 64 IN | DIASTOLIC BLOOD PRESSURE: 70 MMHG | WEIGHT: 107.9 LBS | BODY MASS INDEX: 18.42 KG/M2

## 2024-06-17 VITALS — BODY MASS INDEX: 18.27 KG/M2 | WEIGHT: 107 LBS | HEIGHT: 64 IN

## 2024-06-17 DIAGNOSIS — L70.9 ACNE, UNSPECIFIED ACNE TYPE: ICD-10-CM

## 2024-06-17 DIAGNOSIS — Z12.4 PAP SMEAR FOR CERVICAL CANCER SCREENING: Primary | ICD-10-CM

## 2024-06-17 DIAGNOSIS — N63.31 MASS OF AXILLARY TAIL OF RIGHT BREAST: ICD-10-CM

## 2024-06-17 PROCEDURE — 76642 ULTRASOUND BREAST LIMITED: CPT

## 2024-06-17 PROCEDURE — S0612 ANNUAL GYNECOLOGICAL EXAMINA: HCPCS | Performed by: OBSTETRICS & GYNECOLOGY

## 2024-06-17 PROCEDURE — G0145 SCR C/V CYTO,THINLAYER,RESCR: HCPCS | Performed by: OBSTETRICS & GYNECOLOGY

## 2024-06-17 RX ORDER — NORGESTIMATE AND ETHINYL ESTRADIOL 7DAYSX3 28
1 KIT ORAL DAILY
Qty: 84 TABLET | Refills: 3 | Status: SHIPPED | OUTPATIENT
Start: 2024-06-17

## 2024-06-20 LAB
LAB AP GYN PRIMARY INTERPRETATION: NORMAL
Lab: NORMAL

## 2024-06-26 ENCOUNTER — OFFICE VISIT (OUTPATIENT)
Dept: PHYSICAL THERAPY | Facility: MEDICAL CENTER | Age: 24
End: 2024-06-26
Payer: COMMERCIAL

## 2024-06-26 DIAGNOSIS — M54.2 CERVICALGIA: Primary | ICD-10-CM

## 2024-06-26 PROCEDURE — 97161 PT EVAL LOW COMPLEX 20 MIN: CPT | Performed by: PHYSICAL THERAPIST

## 2024-06-26 NOTE — PROGRESS NOTES
PT Evaluation     Today's date: 2024  Patient name: Mckenna Warren  : 2000  MRN: [id number]  Referring provider: Alexander Mares, PT  Dx:   Encounter Diagnosis     ICD-10-CM    1. Cervicalgia  M54.2                      Assessment  Impairments: abnormal muscle firing, abnormal muscle tone, abnormal or restricted ROM, impaired physical strength, lacks appropriate home exercise program and pain with function    Assessment details: Mckenna Warren is a 23 y.o. female presenting to therapy with complaints of neck pain.   Clinical exam consistent with upper cervical hypomobility resulting in neck pain with associated cervicogenic headaches.    Skilled therapy indicated for manual mobilizations of upper cervical spine for 2-4 weeks.    Understanding of Dx/Px/POC: good     Prognosis: good    Goals  Patient will successfully transition to home exercise program.  Patient will be able to manage symptoms independently.    Mckenna will report resolution of headaches  Mckenna will report no neck pain at end of work day       Plan  Patient would benefit from: skilled PT  Referral necessary: No  Planned modality interventions: thermotherapy: hydrocollator packs    Planned therapy interventions: home exercise program, manual therapy, neuromuscular re-education, patient education, functional ROM exercises, strengthening, stretching, joint mobilization, graded activity, graded exercise, therapeutic exercise, body mechanics training, motor coordination training and activity modification    Frequency: 1x week  Duration in weeks: 12  Treatment plan discussed with: patient        Subjective Evaluation    History of Present Illness  Mechanism of injury: Mckenna Warren is a 23 y.o. female presenting to therapy with complaints of neck pain. She notes pain began around May, located in upper part of neck associated with headaches as well.  She notes pain tends to worsen by  day along with the headaches.  Denies any  numbness or tingling.  Tried occasional chiropractic with some short term relief.   Recently graduated, beginning Med school in one month.    Patient Goals  Patient goals for therapy: decreased pain, increased motion, return to sport/leisure activities, independence with ADLs/IADLs and increased strength    Pain  Current pain ratin  At best pain ratin  At worst pain ratin  Quality: dull ache, discomfort, pressure and tight          Objective  Red Flags:  None present  Observation: Posture WNL   Reflexes:  WNL bilaterally   AROM:  ROM near full in all directions, upper cervical rotation hypomobility bilaterally   PAIVM:   Hypomobility and comparable sign with C1/2 UPA bilaterally   Palpation:  Increased tonicity to bilateral upper trapezius with trigger points    Thoracic:  Hypomobility throughout upper thoracic spine into extension          Precautions: None      Manuals             Prone UPA C1/2                                                   Neuro Re-Ed                                                                                                        Ther Ex             Cervical snags                                                                                                        Ther Activity                                       Gait Training                                       Modalities

## 2024-06-28 NOTE — PROGRESS NOTES
A/P    1.  Annual exam    Last PAP - never   Next due today    Scheduling of pap discussed in detail     2.  Birth control    All options were discussed and she wishes to stay on her pills     Refilled tri sprintec       23 y.o.,Patient's last menstrual period was 06/12/2024 (exact date).  C/O no gyn concerns doing well       Past medical / social / surgical / family history reviewed and updated   Medication and allergies discussed in detail and updated     Review of Systems - History obtained from chart review and the patient  General ROS: negative  Psychological ROS: negative  Ophthalmic ROS: negative  ENT ROS: negative  Allergy and Immunology ROS: negative  Hematological and Lymphatic ROS: negative  Endocrine ROS: negative  Breast ROS: negative for breast lumps  Respiratory ROS: no cough, shortness of breath, or wheezing  Cardiovascular ROS: no chest pain or dyspnea on exertion  Gastrointestinal ROS: no abdominal pain, change in bowel habits, or black or bloody stools  Genito-Urinary ROS: no dysuria, trouble voiding, or hematuria  Musculoskeletal ROS: negative  Neurological ROS: no TIA or stroke symptoms  Dermatological ROS: negative        Physical Exam  Vitals reviewed. Exam conducted with a chaperone present.   Constitutional:       Appearance: She is well-developed.   Neck:      Thyroid: No thyromegaly.   Cardiovascular:      Rate and Rhythm: Normal rate.      Heart sounds: Normal heart sounds.   Pulmonary:      Effort: Pulmonary effort is normal. No accessory muscle usage or respiratory distress.      Breath sounds: Normal air entry.   Chest:      Chest wall: No tenderness.   Breasts:     Breasts are symmetrical.      Right: No inverted nipple, mass or tenderness.      Left: No inverted nipple, mass or tenderness.   Abdominal:      General: There is no distension.      Palpations: Abdomen is soft.      Tenderness: There is no abdominal tenderness. There is no right CVA tenderness, left CVA tenderness,  guarding or rebound.   Genitourinary:     General: Normal vulva.      Exam position: Lithotomy position.      Labia:         Right: No rash, tenderness, lesion or injury.         Left: No rash, tenderness, lesion or injury.       Vagina: Normal. No foreign body. No vaginal discharge or bleeding.      Cervix: Normal.      Uterus: Normal. Not enlarged and not fixed.       Adnexa: Right adnexa normal and left adnexa normal.        Right: No mass, tenderness or fullness.          Left: No mass, tenderness or fullness.        Rectum: No external hemorrhoid.   Musculoskeletal:      Cervical back: Normal range of motion.   Lymphadenopathy:      Cervical: No cervical adenopathy.      Upper Body:      Right upper body: No supraclavicular adenopathy.      Left upper body: No supraclavicular adenopathy.   Neurological:      Mental Status: She is alert and oriented to person, place, and time.   Psychiatric:         Speech: Speech normal.         Behavior: Behavior normal.         Thought Content: Thought content normal.         Judgment: Judgment normal.

## 2024-07-03 ENCOUNTER — OFFICE VISIT (OUTPATIENT)
Dept: PHYSICAL THERAPY | Facility: MEDICAL CENTER | Age: 24
End: 2024-07-03
Payer: COMMERCIAL

## 2024-07-03 DIAGNOSIS — M54.2 CERVICALGIA: Primary | ICD-10-CM

## 2024-07-03 PROCEDURE — 97140 MANUAL THERAPY 1/> REGIONS: CPT | Performed by: PHYSICAL THERAPIST

## 2024-07-03 PROCEDURE — 97110 THERAPEUTIC EXERCISES: CPT | Performed by: PHYSICAL THERAPIST

## 2024-07-03 NOTE — PROGRESS NOTES
Daily Note     Today's date: 7/3/2024  Patient name: Mckenna Warren  : 2000  MRN: [id number]  Referring provider: Alexander Mares, PT  Dx:   Encounter Diagnosis     ICD-10-CM    1. Cervicalgia  M54.2                      Subjective: Mckenna reports that she is doing well, was sore after evaluation for a period but then neck and low back did feel better.  She got a half foam for home       Objective: See treatment diary below      Assessment: Tolerated treatment well. Patient with continued upper cervical and lower lumbar mobility restrictions, improved post mobilizations.  Will use half foam for self mobility, consider massage as well       Plan: Continue per plan of care.      Precautions: None      Manuals 7/3            Prone UPA C1/2            Prone lumbar PA L5                                      Neuro Re-Ed                                                                                                        Ther Ex             Cervical snags             LTR on half foam             SKC half foam             Cervical roation on half foam                                                                  Ther Activity                                       Gait Training                                       Modalities

## 2024-07-10 ENCOUNTER — OFFICE VISIT (OUTPATIENT)
Dept: FAMILY MEDICINE CLINIC | Facility: CLINIC | Age: 24
End: 2024-07-10
Payer: COMMERCIAL

## 2024-07-10 VITALS
TEMPERATURE: 97.7 F | OXYGEN SATURATION: 99 % | SYSTOLIC BLOOD PRESSURE: 100 MMHG | DIASTOLIC BLOOD PRESSURE: 80 MMHG | HEIGHT: 64 IN | HEART RATE: 84 BPM | BODY MASS INDEX: 18.57 KG/M2 | WEIGHT: 108.8 LBS

## 2024-07-10 DIAGNOSIS — J30.9 ALLERGIC RHINITIS, UNSPECIFIED SEASONALITY, UNSPECIFIED TRIGGER: ICD-10-CM

## 2024-07-10 DIAGNOSIS — Z23 ENCOUNTER FOR IMMUNIZATION: ICD-10-CM

## 2024-07-10 DIAGNOSIS — F41.0 ANXIETY ATTACK: ICD-10-CM

## 2024-07-10 DIAGNOSIS — Z00.00 ANNUAL PHYSICAL EXAM: Primary | ICD-10-CM

## 2024-07-10 DIAGNOSIS — R06.2 WHEEZING: ICD-10-CM

## 2024-07-10 PROCEDURE — 99395 PREV VISIT EST AGE 18-39: CPT | Performed by: FAMILY MEDICINE

## 2024-07-10 PROCEDURE — 90471 IMMUNIZATION ADMIN: CPT

## 2024-07-10 PROCEDURE — 90715 TDAP VACCINE 7 YRS/> IM: CPT

## 2024-07-10 RX ORDER — MONTELUKAST SODIUM 10 MG/1
10 TABLET ORAL DAILY
Qty: 90 TABLET | Refills: 1 | Status: SHIPPED | OUTPATIENT
Start: 2024-07-10

## 2024-07-10 RX ORDER — ALBUTEROL SULFATE 90 UG/1
2 AEROSOL, METERED RESPIRATORY (INHALATION) EVERY 6 HOURS PRN
Qty: 18 G | Refills: 0 | Status: SHIPPED | OUTPATIENT
Start: 2024-07-10

## 2024-07-10 RX ORDER — HYDROXYZINE PAMOATE 50 MG/1
50 CAPSULE ORAL 3 TIMES DAILY PRN
Qty: 30 CAPSULE | Refills: 0 | Status: SHIPPED | OUTPATIENT
Start: 2024-07-10

## 2024-07-10 NOTE — PROGRESS NOTES
Adult Annual Physical  Name: Mckenna Warren      : 2000      MRN: [id number]  Encounter Provider: Sharon Knowles DO  Encounter Date: 7/10/2024   Encounter department: [facility]    Assessment & Plan   1. Annual physical exam  2. Encounter for immunization  -     TDAP VACCINE GREATER THAN OR EQUAL TO 8YO IM  3. Wheezing  -     albuterol (PROVENTIL HFA,VENTOLIN HFA) 90 mcg/act inhaler; Inhale 2 puffs every 6 (six) hours as needed for wheezing or shortness of breath  4. Allergic rhinitis, unspecified seasonality, unspecified trigger  -     montelukast (SINGULAIR) 10 mg tablet; Take 1 tablet (10 mg total) by mouth daily  5. Anxiety attack  -     hydrOXYzine pamoate (VISTARIL) 50 mg capsule; Take 1 capsule (50 mg total) by mouth 3 (three) times a day as needed for anxiety    Immunizations and preventive care screenings were discussed with patient today. Appropriate education was printed on patient's after visit summary.    Counseling:  Alcohol/drug use: discussed moderation in alcohol intake, the recommendations for healthy alcohol use, and avoidance of illicit drug use.  Dental Health: discussed importance of regular tooth brushing, flossing, and dental visits.  Exercise: the importance of regular exercise/physical activity was discussed. Recommend exercise 3-5 times per week for at least 30 minutes.       Depression Screening and Follow-up Plan: Patient was screened for depression during today's encounter. They screened negative with a PHQ-2 score of 0.        History of Present Illness     Adult Annual Physical:  Patient presents for annual physical. Patient is here for physical.  She starts med school next month. [employer].  Was seen at GYN last month. Benign axillary lymph node..     Depression Screening:  - PHQ-2 Score: 0    Review of Systems   Constitutional:  Negative for chills and fever.   HENT:  Positive for rhinorrhea. Negative for congestion and sore throat.    Respiratory:  Positive  "for wheezing. Negative for chest tightness.    Cardiovascular:  Negative for chest pain and palpitations.   Gastrointestinal:  Negative for abdominal pain, constipation, diarrhea and nausea.   Genitourinary:  Negative for difficulty urinating.   Skin: Negative.    Neurological:  Negative for dizziness and headaches.   Psychiatric/Behavioral: Negative.           Objective     /80 (BP Location: Left arm, Patient Position: Sitting, Cuff Size: Adult)   Pulse 84   Temp 97.7 °F (36.5 °C)   Ht 5' 4\" (1.626 m)   Wt 49.4 kg (108 lb 12.8 oz)   LMP 07/10/2024   SpO2 99%   BMI 18.68 kg/m²     Physical Exam  Vitals and nursing note reviewed.   Constitutional:       General: She is not in acute distress.     Appearance: She is well-developed.   HENT:      Head: Normocephalic.      Right Ear: Tympanic membrane normal.      Left Ear: Tympanic membrane normal.      Mouth/Throat:      Pharynx: No posterior oropharyngeal erythema.   Eyes:      General: No scleral icterus.  Neck:      Thyroid: No thyromegaly.      Vascular: No carotid bruit.   Cardiovascular:      Rate and Rhythm: Normal rate and regular rhythm.      Heart sounds: Normal heart sounds. No murmur heard.  Pulmonary:      Effort: Pulmonary effort is normal.      Breath sounds: Normal breath sounds.   Abdominal:      General: Bowel sounds are normal.      Palpations: Abdomen is soft.   Musculoskeletal:      Right lower leg: No edema.      Left lower leg: No edema.   Lymphadenopathy:      Cervical: No cervical adenopathy.   Skin:     General: Skin is warm and dry.   Neurological:      Mental Status: She is alert and oriented to person, place, and time.   Psychiatric:         Mood and Affect: Mood normal.         "

## 2024-10-03 ENCOUNTER — LAB (OUTPATIENT)
Dept: LAB | Facility: CLINIC | Age: 24
End: 2024-10-03
Payer: COMMERCIAL

## 2024-10-03 ENCOUNTER — OFFICE VISIT (OUTPATIENT)
Dept: FAMILY MEDICINE CLINIC | Facility: CLINIC | Age: 24
End: 2024-10-03
Payer: COMMERCIAL

## 2024-10-03 VITALS
WEIGHT: 103 LBS | DIASTOLIC BLOOD PRESSURE: 70 MMHG | HEART RATE: 87 BPM | SYSTOLIC BLOOD PRESSURE: 102 MMHG | HEIGHT: 64 IN | TEMPERATURE: 98.2 F | BODY MASS INDEX: 17.58 KG/M2 | OXYGEN SATURATION: 98 %

## 2024-10-03 DIAGNOSIS — M25.50 ARTHRALGIA, UNSPECIFIED JOINT: ICD-10-CM

## 2024-10-03 DIAGNOSIS — R21 RASH: Primary | ICD-10-CM

## 2024-10-03 DIAGNOSIS — R21 RASH: ICD-10-CM

## 2024-10-03 DIAGNOSIS — I73.00 RAYNAUD'S PHENOMENON WITHOUT GANGRENE: ICD-10-CM

## 2024-10-03 DIAGNOSIS — R76.8 ANA POSITIVE: Primary | ICD-10-CM

## 2024-10-03 DIAGNOSIS — Z23 IMMUNIZATION DUE: ICD-10-CM

## 2024-10-03 LAB
ALBUMIN SERPL BCG-MCNC: 4.3 G/DL (ref 3.5–5)
ALP SERPL-CCNC: 40 U/L (ref 34–104)
ALT SERPL W P-5'-P-CCNC: 14 U/L (ref 7–52)
ANA SER QL IA: POSITIVE
ANION GAP SERPL CALCULATED.3IONS-SCNC: 10 MMOL/L (ref 4–13)
AST SERPL W P-5'-P-CCNC: 19 U/L (ref 13–39)
B BURGDOR IGG+IGM SER QL IA: NEGATIVE
BASOPHILS # BLD AUTO: 0.05 THOUSANDS/ΜL (ref 0–0.1)
BASOPHILS NFR BLD AUTO: 1 % (ref 0–1)
BILIRUB SERPL-MCNC: 0.68 MG/DL (ref 0.2–1)
BUN SERPL-MCNC: 15 MG/DL (ref 5–25)
CALCIUM SERPL-MCNC: 9.7 MG/DL (ref 8.4–10.2)
CHLORIDE SERPL-SCNC: 101 MMOL/L (ref 96–108)
CO2 SERPL-SCNC: 28 MMOL/L (ref 21–32)
CREAT SERPL-MCNC: 0.75 MG/DL (ref 0.6–1.3)
CRP SERPL QL: 3.5 MG/L
EOSINOPHIL # BLD AUTO: 0.1 THOUSAND/ΜL (ref 0–0.61)
EOSINOPHIL NFR BLD AUTO: 2 % (ref 0–6)
ERYTHROCYTE [DISTWIDTH] IN BLOOD BY AUTOMATED COUNT: 11.4 % (ref 11.6–15.1)
ERYTHROCYTE [SEDIMENTATION RATE] IN BLOOD: 8 MM/HOUR (ref 0–19)
GFR SERPL CREATININE-BSD FRML MDRD: 114 ML/MIN/1.73SQ M
GLUCOSE P FAST SERPL-MCNC: 75 MG/DL (ref 65–99)
HCT VFR BLD AUTO: 45.4 % (ref 34.8–46.1)
HGB BLD-MCNC: 15.2 G/DL (ref 11.5–15.4)
IMM GRANULOCYTES # BLD AUTO: 0.02 THOUSAND/UL (ref 0–0.2)
IMM GRANULOCYTES NFR BLD AUTO: 0 % (ref 0–2)
LYMPHOCYTES # BLD AUTO: 1.44 THOUSANDS/ΜL (ref 0.6–4.47)
LYMPHOCYTES NFR BLD AUTO: 27 % (ref 14–44)
MCH RBC QN AUTO: 31 PG (ref 26.8–34.3)
MCHC RBC AUTO-ENTMCNC: 33.5 G/DL (ref 31.4–37.4)
MCV RBC AUTO: 93 FL (ref 82–98)
MONOCYTES # BLD AUTO: 0.6 THOUSAND/ΜL (ref 0.17–1.22)
MONOCYTES NFR BLD AUTO: 11 % (ref 4–12)
NEUTROPHILS # BLD AUTO: 3.21 THOUSANDS/ΜL (ref 1.85–7.62)
NEUTS SEG NFR BLD AUTO: 59 % (ref 43–75)
NRBC BLD AUTO-RTO: 0 /100 WBCS
PLATELET # BLD AUTO: 299 THOUSANDS/UL (ref 149–390)
PMV BLD AUTO: 10.9 FL (ref 8.9–12.7)
POTASSIUM SERPL-SCNC: 4.1 MMOL/L (ref 3.5–5.3)
PROT SERPL-MCNC: 7.3 G/DL (ref 6.4–8.4)
RBC # BLD AUTO: 4.91 MILLION/UL (ref 3.81–5.12)
SODIUM SERPL-SCNC: 139 MMOL/L (ref 135–147)
TSH SERPL DL<=0.05 MIU/L-ACNC: 0.7 UIU/ML (ref 0.45–4.5)
WBC # BLD AUTO: 5.42 THOUSAND/UL (ref 4.31–10.16)

## 2024-10-03 PROCEDURE — 86618 LYME DISEASE ANTIBODY: CPT

## 2024-10-03 PROCEDURE — 84443 ASSAY THYROID STIM HORMONE: CPT

## 2024-10-03 PROCEDURE — 85025 COMPLETE CBC W/AUTO DIFF WBC: CPT

## 2024-10-03 PROCEDURE — 90656 IIV3 VACC NO PRSV 0.5 ML IM: CPT

## 2024-10-03 PROCEDURE — 85652 RBC SED RATE AUTOMATED: CPT

## 2024-10-03 PROCEDURE — 86430 RHEUMATOID FACTOR TEST QUAL: CPT

## 2024-10-03 PROCEDURE — 86038 ANTINUCLEAR ANTIBODIES: CPT

## 2024-10-03 PROCEDURE — 99214 OFFICE O/P EST MOD 30 MIN: CPT | Performed by: FAMILY MEDICINE

## 2024-10-03 PROCEDURE — 90471 IMMUNIZATION ADMIN: CPT

## 2024-10-03 PROCEDURE — 80053 COMPREHEN METABOLIC PANEL: CPT

## 2024-10-03 PROCEDURE — 86140 C-REACTIVE PROTEIN: CPT

## 2024-10-03 PROCEDURE — 86039 ANTINUCLEAR ANTIBODIES (ANA): CPT

## 2024-10-03 PROCEDURE — 36415 COLL VENOUS BLD VENIPUNCTURE: CPT

## 2024-10-03 NOTE — PROGRESS NOTES
Assessment/Plan:     1. Rash  Assessment & Plan:  Unclear etiology; maybe allergic reaction to some environmental exposure with hx of dermatographic response; check labs given joint pain at early age; if negative advised f/u with allergy; recommended adding daily antihistamine to see if helps  Orders:  -     Sedimentation rate, automated; Future  -     C-reactive protein; Future  -     Comprehensive metabolic panel; Future; Expected date: 11/03/2024  -     CBC and differential; Future  -     Lyme Total AB W Reflex to IGM/IGG; Future  -     TSH, 3rd generation with Free T4 reflex; Future  -     KULWINDER Screen w/ Reflex to Titer/Pattern; Future  -     RF Screen w/ Reflex to Titer; Future  -     Ambulatory Referral to Allergy; Future  2. Arthralgia, unspecified joint  -     Sedimentation rate, automated; Future  -     C-reactive protein; Future  -     Comprehensive metabolic panel; Future; Expected date: 11/03/2024  -     CBC and differential; Future  -     Lyme Total AB W Reflex to IGM/IGG; Future  -     TSH, 3rd generation with Free T4 reflex; Future  -     KULWINDER Screen w/ Reflex to Titer/Pattern; Future  -     RF Screen w/ Reflex to Titer; Future  -     Ambulatory Referral to Allergy; Future  3. Raynaud's phenomenon without gangrene  4. Immunization due  -     influenza vaccine preservative-free 0.5 mL IM (Fluzone, Afluria, Fluarix, Flulaval)        Subjective:      Patient ID: Mckenna Warren is a 23 y.o. female.    Patient is here with concerns of rash and itching.  States once she starts itching it starts to get red swollen and spreads. Started a little over 2 weeks ago. Comes in flares and worse at night for a few days and then comes down. Resolves for several days and then comes back. Seems to happen all over body. No recent illnesses. She does have joint pain often in general. She is in medical school.  Joint pain is worse in shoulders and ankles. Takes Advil for this. Notices some leg pains at times.   Does have  "seasonal allergies. She is living in a new place so unsure if related to allergies.   Temp seems to go up to 99.5 F when symptoms happen and once she feels better temp drops back to 97.5 F.         The following portions of the patient's history were reviewed and updated as appropriate: allergies, current medications, past family history, past medical history, past social history, past surgical history, and problem list.    Review of Systems   Constitutional:  Positive for fever.   Musculoskeletal:  Positive for arthralgias and joint swelling.   Skin:  Positive for rash.         Objective:      /70 (BP Location: Left arm, Patient Position: Sitting, Cuff Size: Adult)   Pulse 87   Temp 98.2 °F (36.8 °C) (Temporal)   Ht 5' 4\" (1.626 m)   Wt 46.7 kg (103 lb)   SpO2 98%   BMI 17.68 kg/m²          Physical Exam  Vitals reviewed.   Constitutional:       General: She is not in acute distress.     Appearance: Normal appearance. She is not ill-appearing, toxic-appearing or diaphoretic.   HENT:      Head: Normocephalic and atraumatic.   Eyes:      General: No scleral icterus.        Right eye: No discharge.         Left eye: No discharge.      Conjunctiva/sclera: Conjunctivae normal.   Cardiovascular:      Rate and Rhythm: Normal rate and regular rhythm.      Pulses: Normal pulses.      Heart sounds: Normal heart sounds. No murmur heard.     No gallop.   Pulmonary:      Effort: Pulmonary effort is normal. No respiratory distress.      Breath sounds: Normal breath sounds. No stridor. No wheezing, rhonchi or rales.   Musculoskeletal:      Right lower leg: No edema.      Left lower leg: No edema.   Neurological:      General: No focal deficit present.      Mental Status: She is alert and oriented to person, place, and time.   Psychiatric:         Mood and Affect: Mood normal.         Behavior: Behavior normal.         Thought Content: Thought content normal.         Judgment: Judgment normal.         "

## 2024-10-03 NOTE — ASSESSMENT & PLAN NOTE
Unclear etiology; maybe allergic reaction to some environmental exposure with hx of dermatographic response; check labs given joint pain at early age; if negative advised f/u with allergy; recommended adding daily antihistamine to see if helps

## 2024-10-04 LAB
ANA HOMOGEN SER QL IF: NORMAL
ANA HOMOGEN TITR SER: NORMAL {TITER}
RHEUMATOID FACT SER QL LA: NEGATIVE

## 2024-11-15 ENCOUNTER — OFFICE VISIT (OUTPATIENT)
Dept: PHYSICAL THERAPY | Facility: MEDICAL CENTER | Age: 24
End: 2024-11-15
Payer: COMMERCIAL

## 2024-11-15 DIAGNOSIS — G44.86 CERVICOGENIC HEADACHE: ICD-10-CM

## 2024-11-15 DIAGNOSIS — G89.29 CHRONIC NECK PAIN: Primary | ICD-10-CM

## 2024-11-15 DIAGNOSIS — M54.2 CHRONIC NECK PAIN: Primary | ICD-10-CM

## 2024-11-15 PROCEDURE — 97140 MANUAL THERAPY 1/> REGIONS: CPT | Performed by: PHYSICAL THERAPIST

## 2024-11-15 PROCEDURE — 97162 PT EVAL MOD COMPLEX 30 MIN: CPT | Performed by: PHYSICAL THERAPIST

## 2024-11-18 NOTE — PROGRESS NOTES
PT Evaluation     Today's date: 2024  Patient name: Mckenna Warren  : 2000  MRN: [id number]  Referring provider: Alexander Mares, PT  Dx:   Encounter Diagnosis     ICD-10-CM    1. Chronic neck pain  M54.2     G89.29       2. Cervicogenic headache  G44.86                      Assessment/Plan  Patient is a very pleasant 23 yo female who presents with symptoms of chronic mid to lower back pain neck pain and headaches.  Patient's symptoms are consistent with mechanical back pain and cervicogenic headaches secondary to thoracic hypomobility and postural dysfunction.  Patient's chronic pain may also be feeding into nociplastic symptoms.  Patient will benefit from skilled PT services to address her issues of pain and help her to return to normalized function and posturing.  She is expected to progress well being seen 2x a week for the next 4-6 weeks.     Subjective  Patient states that she has been having neck and back pain for years on and off but recently it has been worse.  Patient notes that her pain is in the neck and mostly with attempted extension.  Patient is in college and notes that her pain increases with studying and sitting with a computer.  Patient denies symptoms into her arms and has no complaints of sensation changes.      Objective  Red Flags: none  Pain: 3-8/10  Reflexes:     Right Left   Biceps 2+ 2+   Triceps 2+ 2+   Brachioradialis 2+ 2+   Patellar 2+ 2+   Achilles 2+ 2+   Inverted supinator sign neg neg   clonus neg neg   Babinski neg neg   Posture: increased thoracic kyphosis,   AROM: patient is limited in cervical extension and rotation by 30% with pain in neck.  Patient lumbar motion is WFL.  Thoracic motion is limited to flexion and some rotation however patient can not get to neutral or extension.  Palpation: TTP mid thoracic into cervical musculature. Hypertrophy of cervical musculature including suboccipitals.  Poor mobility throughout thoracic spine with compensation at TLJ and  CTJ  Special Tests: no neurological symptoms at this time.    Coordination of Movement/strengthe: Patient demonstrates poor activation of Longus Capitus and Longus Coli with loss of feed forward mechanism with reaching tasks.   Patient was able to perform activation with cueing. General LE and core stabilizer weakness and poor coordination.      Goals:  - Pt I with initial HEP in 1-2 visits  - pain 2/10  - improved stabilizing structure activation and improved feed forward mechanism with distal activation  - Increase Functional Status Measure measured by FOTO by [facility]  - study without pain         Precautions: none      Manuals             C2-C5 UPA right  Gr. Iv 10sec x5            T2-7  Gr. Iv 10sec x5                                      Neuro Re-Ed                                                                                                        Ther Ex                                                                                                                     Ther Activity                                       Gait Training                                       Modalities

## 2024-11-18 NOTE — TELEPHONE ENCOUNTER
Additional Information  • Negative: Is this related to a work injury?  • Negative: Is this related to an MVA?  • Negative: Are you currently recieving homecare services?  • Negative: Has the patient had unexplained weight loss?  • Negative: Does the patient have a fever?  • Negative: Is the patient experiencing urine retention?  • Negative: Is the patient experiencing acute drop foot or paralysis?  • Negative: Has the patient experienced major trauma? (fall from height, high speed collision, direct blow to spine) and is also experiencing nausea, light-headedness, or loss of consciousness?  • Negative: Is the patient experiencing blood in sputum?  • Affirmative: Is this a chronic condition?    Protocols used: [facility] Protocol

## 2024-12-13 ENCOUNTER — OFFICE VISIT (OUTPATIENT)
Dept: PHYSICAL THERAPY | Facility: MEDICAL CENTER | Age: 24
End: 2024-12-13
Payer: COMMERCIAL

## 2024-12-13 DIAGNOSIS — M54.2 CHRONIC NECK PAIN: Primary | ICD-10-CM

## 2024-12-13 DIAGNOSIS — G89.29 CHRONIC NECK PAIN: Primary | ICD-10-CM

## 2024-12-13 DIAGNOSIS — G44.86 CERVICOGENIC HEADACHE: ICD-10-CM

## 2024-12-13 PROCEDURE — 97110 THERAPEUTIC EXERCISES: CPT | Performed by: PHYSICAL THERAPIST

## 2024-12-13 PROCEDURE — 97140 MANUAL THERAPY 1/> REGIONS: CPT | Performed by: PHYSICAL THERAPIST

## 2024-12-13 NOTE — PROGRESS NOTES
Daily Note     Today's date: 2024  Patient name: Mckenna Warren  : 2000  MRN: [id number]  Referring provider: Alexander Mares, PT  Dx:   Encounter Diagnosis     ICD-10-CM    1. Chronic neck pain  M54.2     G89.29       2. Cervicogenic headache  G44.86                      Subjective: patient states that she has been having her same neck pain and headaches over the past several weeks.  Notes that she was finishing with finals which may have caused more discomfort.       Objective: See treatment diary below      Assessment: Tolerated treatment well. Patient exhibited good technique with therapeutic exercises and would benefit from continued PT.  Focus today on manual therapy and stretching which was all tolerated well with decrease in symptoms.       Plan: Continue per plan of care.      Precautions: none      Manuals             C2-C5 UPA right  Gr. Iv 10sec x5            T2-7  Gr. Iv 10sec x5                                      Neuro Re-Ed                                                                                                        Ther Ex             Thoracic extension over 1/2 roller 10sec x3            Extensions with rotation 10sec x3            Wall sits with thoracic lateral flexion 10sec x3                                                                             Ther Activity                                       Gait Training                                       Modalities

## 2024-12-16 ENCOUNTER — OFFICE VISIT (OUTPATIENT)
Dept: PHYSICAL THERAPY | Facility: MEDICAL CENTER | Age: 24
End: 2024-12-16
Payer: COMMERCIAL

## 2024-12-16 DIAGNOSIS — M54.2 CHRONIC NECK PAIN: Primary | ICD-10-CM

## 2024-12-16 DIAGNOSIS — G89.29 CHRONIC NECK PAIN: Primary | ICD-10-CM

## 2024-12-16 DIAGNOSIS — G44.86 CERVICOGENIC HEADACHE: ICD-10-CM

## 2024-12-16 PROCEDURE — 97110 THERAPEUTIC EXERCISES: CPT | Performed by: PHYSICAL THERAPIST

## 2024-12-16 PROCEDURE — 97140 MANUAL THERAPY 1/> REGIONS: CPT | Performed by: PHYSICAL THERAPIST

## 2024-12-16 NOTE — PROGRESS NOTES
Daily Note     Today's date: 2024  Patient name: Mckenna Warren  : 2000  MRN: [id number]  Referring provider: Alexander Mares, PT  Dx:   Encounter Diagnosis     ICD-10-CM    1. Chronic neck pain  M54.2     G89.29       2. Cervicogenic headache  G44.86                      Subjective: patient states that she has been feeling better overall.  The neck mobility seems to have been really helping as is the HEP.       Objective: See treatment diary below      Assessment: Tolerated treatment well. Patient exhibited good technique with therapeutic exercises and would benefit from continued PT.  Focus today on manual therapy and stretching which was all tolerated well with decrease in symptoms.  Started some gentle strengthening which patient tolerated well without increased symptoms.       Plan: Continue per plan of care.      Precautions: none      Manuals             C2-C5 UPA right  Gr. Iv 10sec x5            T2-7  Gr. Iv 10sec x5                                      Neuro Re-Ed                                                                                                        Ther Ex             Thoracic extension over 1/2 roller 10sec x3            Extensions with rotation 10sec x3            Wall sits with thoracic lateral flexion 10sec x3            3 way scapular loop             Ball up wall thoracic extensions 10x2            CW CCW resisted bands 10x red                                      Ther Activity                                       Gait Training                                       Modalities

## 2024-12-19 ENCOUNTER — OFFICE VISIT (OUTPATIENT)
Dept: PHYSICAL THERAPY | Facility: MEDICAL CENTER | Age: 24
End: 2024-12-19
Payer: COMMERCIAL

## 2024-12-19 DIAGNOSIS — G44.86 CERVICOGENIC HEADACHE: ICD-10-CM

## 2024-12-19 DIAGNOSIS — G89.29 CHRONIC NECK PAIN: Primary | ICD-10-CM

## 2024-12-19 DIAGNOSIS — M54.2 CHRONIC NECK PAIN: Primary | ICD-10-CM

## 2024-12-19 PROCEDURE — 97140 MANUAL THERAPY 1/> REGIONS: CPT | Performed by: PHYSICAL THERAPIST

## 2024-12-19 PROCEDURE — 97110 THERAPEUTIC EXERCISES: CPT | Performed by: PHYSICAL THERAPIST

## 2024-12-19 NOTE — PROGRESS NOTES
Daily Note     Today's date: 2024  Patient name: Mckenna Warren  : 2000  MRN: [id number]  Referring provider: Alexander Mares, PT  Dx:   Encounter Diagnosis     ICD-10-CM    1. Chronic neck pain  M54.2     G89.29       2. Cervicogenic headache  G44.86                      Subjective: patient states that she has been feeling better overall.  The neck mobility seems to have been really helping as is the HEP.  Patient notes that she still has some tightness of the upper back musculature and difficulty with thoracic mobility.       Objective: See treatment diary below      Assessment: Tolerated treatment well. Patient exhibited good technique with therapeutic exercises and would benefit from continued PT.  Focus today on manual therapy and stretching which was all tolerated well with decrease in symptoms.  Started some gentle strengthening which patient tolerated well without increased symptoms.       Plan: Continue per plan of care.      Precautions: none      Manuals             C2-C5 UPA right  Gr. Iv 10sec x5            T2-7  Gr. Iv 10sec x5                                      Neuro Re-Ed                                                                                                        Ther Ex             Thoracic extension over 1/2 roller 10sec x3            Extensions with rotation 10sec x3            Wall sits with thoracic lateral flexion 10sec x3            3 way scapular loop             Ball up wall thoracic extensions 10x2            CW CCW resisted bands 10x red                                      Ther Activity                                       Gait Training                                       Modalities

## 2024-12-23 ENCOUNTER — OFFICE VISIT (OUTPATIENT)
Dept: PHYSICAL THERAPY | Facility: MEDICAL CENTER | Age: 24
End: 2024-12-23
Payer: COMMERCIAL

## 2024-12-23 DIAGNOSIS — G89.29 CHRONIC NECK PAIN: Primary | ICD-10-CM

## 2024-12-23 DIAGNOSIS — M54.2 CHRONIC NECK PAIN: Primary | ICD-10-CM

## 2024-12-23 PROCEDURE — 97140 MANUAL THERAPY 1/> REGIONS: CPT | Performed by: PHYSICAL THERAPIST

## 2024-12-23 NOTE — PROGRESS NOTES
Daily Note     Today's date: 2024  Patient name: Mckenna Warren  : 2000  MRN: [id number]  Referring provider: Alexander Mares, PT  Dx:   Encounter Diagnosis     ICD-10-CM    1. Chronic neck pain  M54.2     G89.29                      Subjective: patient states that she has been feeling better making good gains with her mobility and less pain overall.     Objective: See treatment diary below      Assessment: Tolerated treatment well. Patient exhibited good technique with therapeutic exercises and would benefit from continued PT.  Focus today on manual therapy and stretching which was all tolerated well with decrease in symptoms.  Started some gentle strengthening which patient tolerated well without increased symptoms.       Plan: Continue per plan of care.      Precautions: none      Manuals             C2-C5 UPA right  Gr. Iv 10sec x5            T2-7  Gr. Iv 10sec x5                                      Neuro Re-Ed                                                                                                        Ther Ex             Thoracic extension over 1/2 roller 10sec x3            Extensions with rotation 10sec x3            Wall sits with thoracic lateral flexion 10sec x3            3 way scapular loop             Ball up wall thoracic extensions 10x2            CW CCW resisted bands 10x red                                      Ther Activity                                       Gait Training                                       Modalities

## 2024-12-27 ENCOUNTER — APPOINTMENT (OUTPATIENT)
Dept: PHYSICAL THERAPY | Facility: MEDICAL CENTER | Age: 24
End: 2024-12-27
Payer: COMMERCIAL

## 2024-12-27 ENCOUNTER — HOSPITAL ENCOUNTER (OUTPATIENT)
Dept: RADIOLOGY | Facility: HOSPITAL | Age: 24
Discharge: HOME | End: 2024-12-27
Payer: COMMERCIAL

## 2024-12-27 ENCOUNTER — RESULTS FOLLOW-UP (OUTPATIENT)
Dept: RHEUMATOLOGY | Facility: CLINIC | Age: 24
End: 2024-12-27

## 2024-12-27 ENCOUNTER — APPOINTMENT (OUTPATIENT)
Dept: LAB | Facility: CLINIC | Age: 24
End: 2024-12-27
Payer: COMMERCIAL

## 2024-12-27 ENCOUNTER — OFFICE VISIT (OUTPATIENT)
Dept: RHEUMATOLOGY | Facility: CLINIC | Age: 24
End: 2024-12-27
Payer: COMMERCIAL

## 2024-12-27 VITALS
WEIGHT: 108 LBS | TEMPERATURE: 98.8 F | DIASTOLIC BLOOD PRESSURE: 74 MMHG | BODY MASS INDEX: 18.54 KG/M2 | HEART RATE: 109 BPM | OXYGEN SATURATION: 97 % | SYSTOLIC BLOOD PRESSURE: 122 MMHG

## 2024-12-27 DIAGNOSIS — M25.50 ARTHRALGIA, UNSPECIFIED JOINT: ICD-10-CM

## 2024-12-27 DIAGNOSIS — R50.81 FEVER IN OTHER DISEASES: ICD-10-CM

## 2024-12-27 DIAGNOSIS — R21 RASH: Primary | ICD-10-CM

## 2024-12-27 DIAGNOSIS — R21 RASH: ICD-10-CM

## 2024-12-27 LAB
ALBUMIN SERPL BCG-MCNC: 4.5 G/DL (ref 3.5–5)
ALP SERPL-CCNC: 47 U/L (ref 34–104)
ALT SERPL W P-5'-P-CCNC: 13 U/L (ref 7–52)
AST SERPL W P-5'-P-CCNC: 19 U/L (ref 13–39)
BASOPHILS # BLD AUTO: 0.03 THOUSANDS/ÂΜL (ref 0–0.1)
BASOPHILS NFR BLD AUTO: 0 % (ref 0–1)
BILIRUB DIRECT SERPL-MCNC: 0.1 MG/DL (ref 0–0.2)
BILIRUB SERPL-MCNC: 0.62 MG/DL (ref 0.2–1)
CALCIUM SERPL-MCNC: 9.4 MG/DL (ref 8.4–10.2)
CK SERPL-CCNC: 81 U/L (ref 26–192)
CREAT SERPL-MCNC: 0.66 MG/DL (ref 0.6–1.3)
CRP SERPL QL: 4.9 MG/L
EOSINOPHIL # BLD AUTO: 0.03 THOUSAND/ÂΜL (ref 0–0.61)
EOSINOPHIL NFR BLD AUTO: 0 % (ref 0–6)
ERYTHROCYTE [DISTWIDTH] IN BLOOD BY AUTOMATED COUNT: 11.6 % (ref 11.6–15.1)
ERYTHROCYTE [SEDIMENTATION RATE] IN BLOOD: 6 MM/HOUR (ref 0–19)
FERRITIN SERPL-MCNC: 15 NG/ML (ref 11–307)
FIBRINOGEN PPP-MCNC: 282 MG/DL (ref 206–523)
GFR SERPL CREATININE-BSD FRML MDRD: 125 ML/MIN/1.73SQ M
HCT VFR BLD AUTO: 39.1 % (ref 34.8–46.1)
HGB BLD-MCNC: 13.6 G/DL (ref 11.5–15.4)
IMM GRANULOCYTES # BLD AUTO: 0.02 THOUSAND/UL (ref 0–0.2)
IMM GRANULOCYTES NFR BLD AUTO: 0 % (ref 0–2)
INR PPP: 0.87 (ref 0.85–1.19)
LYMPHOCYTES # BLD AUTO: 1.09 THOUSANDS/ÂΜL (ref 0.6–4.47)
LYMPHOCYTES NFR BLD AUTO: 14 % (ref 14–44)
MCH RBC QN AUTO: 31.2 PG (ref 26.8–34.3)
MCHC RBC AUTO-ENTMCNC: 34.8 G/DL (ref 31.4–37.4)
MCV RBC AUTO: 90 FL (ref 82–98)
MONOCYTES # BLD AUTO: 0.62 THOUSAND/ÂΜL (ref 0.17–1.22)
MONOCYTES NFR BLD AUTO: 8 % (ref 4–12)
NEUTROPHILS # BLD AUTO: 6.04 THOUSANDS/ÂΜL (ref 1.85–7.62)
NEUTS SEG NFR BLD AUTO: 78 % (ref 43–75)
NRBC BLD AUTO-RTO: 0 /100 WBCS
PLATELET # BLD AUTO: 253 THOUSANDS/UL (ref 149–390)
PMV BLD AUTO: 10.4 FL (ref 8.9–12.7)
PROT SERPL-MCNC: 7.1 G/DL (ref 6.4–8.4)
PROTHROMBIN TIME: 12.5 SECONDS (ref 12.3–15)
RBC # BLD AUTO: 4.36 MILLION/UL (ref 3.81–5.12)
WBC # BLD AUTO: 7.83 THOUSAND/UL (ref 4.31–10.16)

## 2024-12-27 PROCEDURE — 85384 FIBRINOGEN ACTIVITY: CPT

## 2024-12-27 PROCEDURE — 82565 ASSAY OF CREATININE: CPT

## 2024-12-27 PROCEDURE — 82550 ASSAY OF CK (CPK): CPT

## 2024-12-27 PROCEDURE — 83520 IMMUNOASSAY QUANT NOS NONAB: CPT

## 2024-12-27 PROCEDURE — 72070 X-RAY EXAM THORAC SPINE 2VWS: CPT

## 2024-12-27 PROCEDURE — 36415 COLL VENOUS BLD VENIPUNCTURE: CPT

## 2024-12-27 PROCEDURE — 86140 C-REACTIVE PROTEIN: CPT

## 2024-12-27 PROCEDURE — 72110 X-RAY EXAM L-2 SPINE 4/>VWS: CPT

## 2024-12-27 PROCEDURE — 72040 X-RAY EXAM NECK SPINE 2-3 VW: CPT

## 2024-12-27 PROCEDURE — 85610 PROTHROMBIN TIME: CPT

## 2024-12-27 PROCEDURE — 85652 RBC SED RATE AUTOMATED: CPT

## 2024-12-27 PROCEDURE — 80076 HEPATIC FUNCTION PANEL: CPT

## 2024-12-27 PROCEDURE — 99244 OFF/OP CNSLTJ NEW/EST MOD 40: CPT | Performed by: STUDENT IN AN ORGANIZED HEALTH CARE EDUCATION/TRAINING PROGRAM

## 2024-12-27 PROCEDURE — 85025 COMPLETE CBC W/AUTO DIFF WBC: CPT

## 2024-12-27 PROCEDURE — 82728 ASSAY OF FERRITIN: CPT

## 2024-12-27 RX ORDER — MELOXICAM 15 MG/1
15 TABLET ORAL DAILY
Qty: 30 TABLET | Refills: 2 | Status: SHIPPED | OUTPATIENT
Start: 2024-12-27

## 2024-12-27 NOTE — PROGRESS NOTES
Name: Mckenna Warren      : 2000      MRN: [id number]  Encounter Provider: Uday King DO  Encounter Date: 2024   Encounter department: [facility] [place]  :  Assessment & Plan  Rash  Several symptoms are concerning for possible early autoinflammatory disease during the 3-week period she had her rash (rash on torso and extremities sparing face that consistently worsened with fevers which were consistently in the evenings and resolved by morning, hand and foot swelling with rashes). Not currently having rash but does have development of linear raised erythema over areas of fingernail dragging which are reminiscent of Koebner's in AOSD.    Further workup as below, if symptoms recur will get labs again  Orders:    Ambulatory Referral to Rheumatology    HLA-B27 antigen; Future    C-reactive protein; Future    Sedimentation rate, automated; Future    CK; Future    MISCELLANEOUS LAB TEST; Future    Fibrinogen; Future    MISCELLANEOUS LAB TEST; Future    Protime-INR; Future    Fever in other diseases    Orders:    HLA-B27 antigen; Future    C-reactive protein; Future    Sedimentation rate, automated; Future    CK; Future    MISCELLANEOUS LAB TEST; Future    Fibrinogen; Future    MISCELLANEOUS LAB TEST; Future    Protime-INR; Future    Ferritin; Future    Creatinine, serum; Future    Hepatic function panel; Future    Calcium; Future    CBC and differential; Future    Arthralgia, unspecified joint  Significant back stiffness symptoms so will investigate further for a possible SpA  Orders:    Ambulatory Referral to Rheumatology    HLA-B27 antigen; Future    C-reactive protein; Future    Sedimentation rate, automated; Future    CK; Future    MISCELLANEOUS LAB TEST; Future    Fibrinogen; Future    MISCELLANEOUS LAB TEST; Future    Protime-INR; Future    meloxicam (Mobic) 15 mg tablet; Take 1 tablet (15 mg total) by mouth daily    XR spine thoracic 2 vw; Future    XR spine cervical 2  "or 3 vw injury; Future    XR spine lumbar minimum 4 views non injury; Future        History of Present Illness     In Sep/Oct, had a flare of a rash that lasted about 3 weeks (with a couple of days of break). Would happen if she scratched, would get a linear rash that would then get hive-like. All over the body except the face. Feet would swell up. Would start at the wrists, would get a lot in the forearms and shoulders and the tops of the feet and shins. Hasn't recurred    Has always had some discomfort in some joints; shoulders, hips, knees, ankles, and hands. They get \"hot\" but aren't painful. Feel like they need to be squeezed or popped. Advil sometimes helps, but not always. Also gets discomfort in the neck.    Currently in PT for the neck and spine. Has very limited mobility; spine is painful.    Gets swollen lymph nodes in the neck and armpits commonly. She does feel that gets worse if her joints are more uncomfortable.    Feels stiff in the joints in the mornings, mainly in neck, shoulders, and back. Takes an hour or so to loosen up in the mornings. Activity helps. Gets gelling for example if in the car for a long time. Hands and feet okay in the mornings, get worse by the evening, but no noted gelling in the hands and feet. Hands and feet reliably worse with use, better with rest. 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    With the rash, she would get elevated temps, up to around 100.5. would notice these more in the evenings, would never wake up with a fever. Rashes were worse in the evenings. No associated sore throat, stomach pain.    Notably was NOT having the symptoms at the beginning of October, symptoms had been gone for several days.    Gets muscle pain as well. Advil helps. Typically the quads are the worst, sometimes the calves, rarely the forearms.    Hands and feet did feel like they would get swollen when " she was having issues with the rash. Still does get swelling in the knuckles sometimes, worse with use but does sometimes happen at rest as well.    Sometimes feels back pain with deep breaths, thinks it's because of her back pain and tightness.    Occasional numbness in feet with stretching or squatting, not related to the rash    In high school used to have Raynaud's, does not have that anymore since going to college.    Denies:  Oral/nasal/genital ulcers  Muscle weakness  Uveitis  Dactylitis  Dysphagia/odynophagia  CP  PRATER  SOB at rest  Pleurisy  Stomach pain  Hematochezia  Gross hematuria  Foamy urine  Raynaud's  Joint issues other than noted above    Some of her father's cousins have SLE. No immediate family members with known rheumatologic disease.     Objective   /74 (BP Location: Left arm)   Pulse (!) 109   Temp 98.8 °F (37.1 °C)   Wt 49 kg (108 lb)   SpO2 97%   BMI 18.54 kg/m²      General: Well appearing, in no distress.   Eyes: Sclera non-icteric. EOMI  Extremities: Warm, well perfused, no edema.   Neuro: Alert and oriented. No gross focal neurological deficits.   Skin: No rashes.  MSK exam: tenderness to palpation few PIPs wo synovitis, tenderness to palpation C-spine and paraspinals and upper T-spine. No other spinal tenderness to palpation. Neg Schober, neg O2W     Latest Reference Range & Units Most Recent   Sed Rate 0 - 19 mm/hour 8  10/3/24 11:31   KULWINDER SCREEN Negative  Positive !  10/3/24 11:31   KULWINDER Pattern 1  Cytoplasmic (non-nuclear)  10/3/24 11:31   KULWINDER Titer 1  Titer of 40  10/3/24 11:31   CYCLIC CITRULLINATED PEPTIDE ANTIBODY  5  5/5/17 16:41   RHEUMATOID FACTOR Negative  Negative  10/3/24 11:31   C-REACTIVE PROTEIN <3.0 mg/L 3.5 (H)  10/3/24 11:31   !: Data is abnormal  (H): Data is abnormally high    I have independently reviewed the following labs/images and interpret them as follows.  KULWINDER of 1:40 is NOT positive  CRP of 3.5 is NOT elevated

## 2024-12-27 NOTE — ASSESSMENT & PLAN NOTE
Several symptoms are concerning for possible early autoinflammatory disease during the 3-week period she had her rash (rash on torso and extremities sparing face that consistently worsened with fevers which were consistently in the evenings and resolved by morning, hand and foot swelling with rashes). Not currently having rash but does have development of linear raised erythema over areas of fingernail dragging which are reminiscent of Koebner's in AOSD.    Further workup as below, if symptoms recur will get labs again  Orders:    Ambulatory Referral to Rheumatology    HLA-B27 antigen; Future    C-reactive protein; Future    Sedimentation rate, automated; Future    CK; Future    MISCELLANEOUS LAB TEST; Future    Fibrinogen; Future    MISCELLANEOUS LAB TEST; Future    Protime-INR; Future

## 2024-12-27 NOTE — PATIENT INSTRUCTIONS
Your KULWINDER level of 1:40 is not considered positive.    While on the prescribed NSAID, you should not take other NSAIDs. If you develop any GI upset, vomiting, abdominal pain, black/bloody stools, you should stop the prescribed NSAID immediately and let me know.    Get blood work and x-rays     Let me know if your rash returns

## 2024-12-30 ENCOUNTER — OFFICE VISIT (OUTPATIENT)
Dept: PHYSICAL THERAPY | Facility: MEDICAL CENTER | Age: 24
End: 2024-12-30
Payer: COMMERCIAL

## 2024-12-30 DIAGNOSIS — G44.86 CERVICOGENIC HEADACHE: ICD-10-CM

## 2024-12-30 DIAGNOSIS — M54.2 CHRONIC NECK PAIN: Primary | ICD-10-CM

## 2024-12-30 DIAGNOSIS — G89.29 CHRONIC NECK PAIN: Primary | ICD-10-CM

## 2024-12-30 PROCEDURE — 97140 MANUAL THERAPY 1/> REGIONS: CPT | Performed by: PHYSICAL THERAPIST

## 2024-12-30 PROCEDURE — 97110 THERAPEUTIC EXERCISES: CPT | Performed by: PHYSICAL THERAPIST

## 2024-12-30 NOTE — PROGRESS NOTES
Daily Note     Today's date: 2024  Patient name: Mckenna Warren  : 2000  MRN: [id number]  Referring provider: Alexander Mares, PT  Dx:   Encounter Diagnosis     ICD-10-CM    1. Chronic neck pain  M54.2     G89.29       2. Cervicogenic headache  G44.86                      Subjective: patient states that she has been feeling better making good gains with her mobility and less pain overall.     Objective: See treatment diary below      Assessment: Tolerated treatment well. Patient exhibited good technique with therapeutic exercises and would benefit from continued PT.  Focus today on manual therapy and stretching which was all tolerated well with decrease in symptoms.  Thoracic mobility is much improved and she is able to move through 75% of range without discomfort.       Plan: Continue per plan of care.      Precautions: none      Manuals             C2-C5 UPA right  Gr. Iv 10sec x5            T2-7  Gr. Iv 10sec x5                                      Neuro Re-Ed                                                                                                        Ther Ex             Thoracic extension over 1/2 roller 10sec x3            Extensions with rotation 10sec x3            Wall sits with thoracic lateral flexion 10sec x3            3 way scapular loop             Ball up wall thoracic extensions 10x2            CW CCW resisted bands 10x red                                      Ther Activity                                       Gait Training                                       Modalities

## 2025-01-01 LAB — MISCELLANEOUS LAB TEST RESULT: NORMAL

## 2025-01-03 ENCOUNTER — OFFICE VISIT (OUTPATIENT)
Dept: PHYSICAL THERAPY | Facility: MEDICAL CENTER | Age: 25
End: 2025-01-03
Payer: COMMERCIAL

## 2025-01-03 DIAGNOSIS — G44.86 CERVICOGENIC HEADACHE: ICD-10-CM

## 2025-01-03 DIAGNOSIS — M54.2 CHRONIC NECK PAIN: Primary | ICD-10-CM

## 2025-01-03 DIAGNOSIS — G89.29 CHRONIC NECK PAIN: Primary | ICD-10-CM

## 2025-01-03 PROCEDURE — 97110 THERAPEUTIC EXERCISES: CPT | Performed by: PHYSICAL THERAPIST

## 2025-01-03 PROCEDURE — 97140 MANUAL THERAPY 1/> REGIONS: CPT | Performed by: PHYSICAL THERAPIST

## 2025-01-03 NOTE — PROGRESS NOTES
Daily Note     Today's date: 1/3/2025  Patient name: Mckenna Warren  : 2000  MRN: [id number]  Referring provider: Alexander Mares, PT  Dx:   Encounter Diagnosis     ICD-10-CM    1. Chronic neck pain  M54.2     G89.29       2. Cervicogenic headache  G44.86                      Subjective: patient states that she has been feeling better making good gains with her mobility and less pain overall.  Ready to go back to school and see how she does on her own.     Objective: See treatment diary below      Assessment: Mckenna Warren has been compliant with attending PT and home exercise program since initial eval.  Mckenna  has made improvements in objective data since initial evalulation and has achieved all goals.  Patient reports having returned to their prior level or function.  It was mutually agreed to Discharge to home exercise program at this time.    Precautions: none      Manuals             C2-C5 UPA right  Gr. Iv 10sec x5            T2-7  Gr. Iv 10sec x5                                      Neuro Re-Ed                                                                                                        Ther Ex             Thoracic extension over 1/2 roller 10sec x3            Extensions with rotation 10sec x3            Wall sits with thoracic lateral flexion 10sec x3            3 way scapular loop             Ball up wall thoracic extensions 10x2            CW CCW resisted bands 10x red                                      Ther Activity                                       Gait Training                                       Modalities

## 2025-01-04 LAB — HLA-B27 QL NAA+PROBE: NEGATIVE

## 2025-01-08 LAB — MISCELLANEOUS LAB TEST RESULT: NORMAL

## 2025-01-08 NOTE — RESULT ENCOUNTER NOTE
CXCL9 900, above lab reference range but in AOSD (specifically active AOSD) would expect it to be much higher. Unclear significance but may represent AOSD or some other inflammatory issue    If symptoms recur will check IL-18

## 2025-02-07 ENCOUNTER — EVALUATION (OUTPATIENT)
Dept: PHYSICAL THERAPY | Facility: MEDICAL CENTER | Age: 25
End: 2025-02-07
Payer: COMMERCIAL

## 2025-02-07 DIAGNOSIS — G44.86 CERVICOGENIC HEADACHE: Primary | ICD-10-CM

## 2025-02-07 PROCEDURE — 97164 PT RE-EVAL EST PLAN CARE: CPT | Performed by: PHYSICAL THERAPIST

## 2025-02-07 PROCEDURE — 97140 MANUAL THERAPY 1/> REGIONS: CPT | Performed by: PHYSICAL THERAPIST

## 2025-02-07 NOTE — PROGRESS NOTES
PT Re-Evaluation     Today's date: 2025  Patient name: Mckenna Warren  : 2000  MRN: [id number]  Referring provider: Alexander Mares, PT  Dx:   Encounter Diagnosis     ICD-10-CM    1. Cervicogenic headache  G44.86                      Assessment/Plan  Patient is a very pleasant 25 yo female who presents with symptoms of chronic mid to lower back pain neck pain and headaches.  Patient's symptoms are consistent with mechanical back pain and cervicogenic headaches secondary to thoracic hypomobility and postural dysfunction.  Patient had been seen for this issue last month however symptoms returned for approximately 5 days.  Patient feels that this increased symptom may have been due to hormonal fluctuation and the symptoms have resolved.  She wanted to keep the appointment today to make sure that all is moving well and have several questions answered.  Patient will continue to monitor the symptoms and return in the future if needed.      Objective  Red Flags: none  Pain: 3-810  Reflexes:     Right Left   Biceps 2+ 2+   Triceps 2+ 2+   Brachioradialis 2+ 2+   Patellar 2+ 2+   Achilles 2+ 2+   Inverted supinator sign neg neg   clonus neg neg   Babinski neg neg   Posture: increased thoracic kyphosis,   AROM: patient is limited in cervical extension and rotation by 10% with pain in neck.  Patient lumbar motion is WFL.  Thoracic motion is limited to flexion and some rotation however patient can not get to neutral or extension.  Palpation: TTP mid thoracic into cervical musculature. Hypertrophy of cervical musculature including suboccipitals.  fair mobility throughout thoracic spine with compensation at TLJ and CTJ  Special Tests: no neurological symptoms at this time.       Goals:  Goals previously met       Precautions: none      Manuals             C2-C5 UPA right  Gr. Iv 10sec x5            T2-7  Gr. Iv 10sec x5                                      Neuro Re-Ed                                                                                                         Ther Ex                                                                                                                     Ther Activity                                       Gait Training                                       Modalities

## 2025-06-30 ENCOUNTER — OFFICE VISIT (OUTPATIENT)
Dept: RHEUMATOLOGY | Facility: CLINIC | Age: 25
End: 2025-06-30
Payer: COMMERCIAL

## 2025-06-30 VITALS
HEART RATE: 72 BPM | SYSTOLIC BLOOD PRESSURE: 128 MMHG | HEIGHT: 64 IN | OXYGEN SATURATION: 97 % | DIASTOLIC BLOOD PRESSURE: 80 MMHG | BODY MASS INDEX: 17.93 KG/M2 | WEIGHT: 105 LBS

## 2025-06-30 DIAGNOSIS — R50.81 FEVER IN OTHER DISEASES: ICD-10-CM

## 2025-06-30 DIAGNOSIS — M25.69 BACK STIFFNESS: Primary | ICD-10-CM

## 2025-06-30 DIAGNOSIS — R21 RASH: ICD-10-CM

## 2025-06-30 DIAGNOSIS — R42 EPISODIC LIGHTHEADEDNESS: ICD-10-CM

## 2025-06-30 DIAGNOSIS — L70.9 ACNE, UNSPECIFIED ACNE TYPE: ICD-10-CM

## 2025-06-30 PROCEDURE — 99214 OFFICE O/P EST MOD 30 MIN: CPT | Performed by: STUDENT IN AN ORGANIZED HEALTH CARE EDUCATION/TRAINING PROGRAM

## 2025-06-30 NOTE — ASSESSMENT & PLAN NOTE
Hasn't recurred    Labs still valid through the calendar year, if she flares again will get labs done and see her back

## 2025-06-30 NOTE — PROGRESS NOTES
Name: Mckenna Warren      : 2000      MRN: [id number]  Encounter Provider: Uday King DO  Encounter Date: 2025   Encounter department: [facility] [place]  :  Assessment & Plan  Back stiffness  Managing fine on PRN ibuprofen, didn't need meloxicam. Symptoms improved somewhat with ergonomic changes    HLA-B27 has high NPV for SpA but still has symptoms that could be consistent with an early SpA    If needs ibuprofen more and more frequently, will try daily meloxicam       Episodic lightheadedness  With vision getting affected, I wonder about a circulation issue or some sort of arrhythmia that she's just not feeling. Does sound somewhat POTS-like. Her smartwatch hasn't detected any specific arrhythmias, but she thinks that when she stands, her HR goes up but then doesn't go all the way back down.    No suspicious that this is from any active rheum issue       Rash  Fever in other diseases  Hasn't recurred    Labs still valid through the calendar year, if she flares again will get labs done and see her back         RTC 1 year or sooner PRN; if no significant changes in symptoms in a year then will just have her follow up PRN    History of Present Illness     Has made some ergonomic changes that has helped her back pain. Went to PT for the headaches and those went away. Does still have stiffness in the AMs. Takes about 2 hours to start improving if no activity, about an hour if she starts moving. Back stiffness has been going on for at least a couple of years at this point. Not bothering her too much. Does take ibuprofen for this but hasn't been taking it lately. When she does take it, notices significant improvement in her back.    Hasn't had flareups of her rash/fevers since before she saw me.    Other than the back, no joint issues.    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    Didn't end up taking the meloxicam at all because she  "wasn't getting as much pain due to sitting a lot.    This year - whenever she's walking or standing for too long, has been getting lightheaded, almost presyncopal. On a treadmill, on a walk, even if just standing for too long. If she walks or is on the treadmill, will get episodes where she very suddenly feels like she's going to fall and she doesn't have control of her body anymore. Doesn't specifically feel dehydrated, but doesn't feel like she drinks as much water while the school year is going on. She is drinking more water now, but isn't working out as regularly. Despite this, she still gets lightheaded if she's standing for a long time.     No chest pain, not sure if she has any issues with palpitations. No noted SOB with these lightheadedness episodes. During these episodes, feels almost like she's not in her head. Sometimes sees stars. No headache.    Does get symptoms that sound like orthostatic hypotension.    Permanent history: First saw me Dec 2024 for a rash in Sep that lasted for about 3 weeks with some flagellate erythema that sounded like Koebner's with associated foot swelling and joint pains and elevated T with max around 100.5, worse in the evenings and resolved by AM without associated sore throat or stomach pain. Symptoms were somewhat concerning for possible autoinflammatory disease.    Also with significant back stiffness symptoms so investigated for a SpA.     Objective   /80   Pulse 72   Ht 5' 4\" (1.626 m)   Wt 47.6 kg (105 lb)   SpO2 97%   BMI 18.02 kg/m²     General: Well appearing, in no distress.   Eyes: Sclera non-icteric. EOMI  Extremities: Warm, well perfused   Neuro: Alert and oriented. No gross focal neurological deficits.   Skin: No rashes.  MSK exam: no tenderness to palpation, synovitis, synovial hypertrophy any peripheral joint or spine. Neg mod Schober, neg O2W. Some mild tenderness to palpation interscapular muscles     Latest Reference Range & Units 12/27/24 14:53 "   Total CK 26 - 192 U/L 81   Sed Rate 0 - 19 mm/hour 6   HLA B27  Negative   C-REACTIVE PROTEIN <3.0 mg/L 4.9 (H)   (H): Data is abnormally high    I have independently reviewed the following labs/images and interpret them as follows.  CRP of 4.9 is not elevated, ULN would be around 10.4

## 2025-07-02 RX ORDER — NORGESTIMATE AND ETHINYL ESTRADIOL 7DAYSX3 28
1 KIT ORAL DAILY
Qty: 84 TABLET | Refills: 0 | Status: SHIPPED | OUTPATIENT
Start: 2025-07-02 | End: 2025-07-09 | Stop reason: SDUPTHER

## 2025-07-09 ENCOUNTER — TELEPHONE (OUTPATIENT)
Dept: FAMILY MEDICINE CLINIC | Facility: CLINIC | Age: 25
End: 2025-07-09

## 2025-07-09 DIAGNOSIS — L70.9 ACNE, UNSPECIFIED ACNE TYPE: ICD-10-CM

## 2025-07-09 RX ORDER — NORGESTIMATE AND ETHINYL ESTRADIOL 7DAYSX3 28
1 KIT ORAL DAILY
Qty: 28 TABLET | Refills: 0 | Status: SHIPPED | OUTPATIENT
Start: 2025-07-09

## 2025-07-09 NOTE — TELEPHONE ENCOUNTER
Pt calling about her birth control, she states that [facility] is very backed up and are unable to get her script filled and she is leaving early am tomorrow for vacation, she is asking if doctor could send a script for just one pack to her local pharmacy, please advise

## 2025-07-21 ENCOUNTER — TELEPHONE (OUTPATIENT)
Dept: FAMILY MEDICINE CLINIC | Facility: CLINIC | Age: 25
End: 2025-07-21

## 2025-07-22 ENCOUNTER — OFFICE VISIT (OUTPATIENT)
Dept: FAMILY MEDICINE CLINIC | Facility: CLINIC | Age: 25
End: 2025-07-22
Payer: COMMERCIAL

## 2025-07-22 VITALS
BODY MASS INDEX: 18.51 KG/M2 | DIASTOLIC BLOOD PRESSURE: 80 MMHG | OXYGEN SATURATION: 99 % | TEMPERATURE: 98 F | SYSTOLIC BLOOD PRESSURE: 122 MMHG | WEIGHT: 108.4 LBS | HEART RATE: 64 BPM | HEIGHT: 64 IN

## 2025-07-22 DIAGNOSIS — Z00.00 ANNUAL PHYSICAL EXAM: Primary | ICD-10-CM

## 2025-07-22 DIAGNOSIS — R42 LIGHTHEADED: ICD-10-CM

## 2025-07-22 PROCEDURE — 99214 OFFICE O/P EST MOD 30 MIN: CPT | Performed by: NURSE PRACTITIONER

## 2025-07-22 PROCEDURE — 93000 ELECTROCARDIOGRAM COMPLETE: CPT | Performed by: NURSE PRACTITIONER

## 2025-07-22 PROCEDURE — 99395 PREV VISIT EST AGE 18-39: CPT | Performed by: NURSE PRACTITIONER

## 2025-07-22 NOTE — PATIENT INSTRUCTIONS
"Patient Education     Routine physical for adults   The Basics   Written by the doctors and editors at [facility]   What is a physical? -- A physical is a routine visit, or \"check-up,\" with your doctor. You might also hear it called a \"wellness visit\" or \"preventive visit.\"  During each visit, the doctor will:   Ask about your physical and mental health   Ask about your habits, behaviors, and lifestyle   Do an exam   Give you vaccines if needed   Talk to you about any medicines you take   Give advice about your health   Answer your questions  Getting regular check-ups is an important part of taking care of your health. It can help your doctor find and treat any problems you have. But it's also important for preventing health problems.  A routine physical is different from a \"sick visit.\" A sick visit is when you see a doctor because of a health concern or problem. Since physicals are scheduled ahead of time, you can think about what you want to ask the doctor.  How often should I get a physical? -- It depends on your age and health. In general, for people age 21 years and older:   If you are younger than 50 years, you might be able to get a physical every 3 years.   If you are 50 years or older, your doctor might recommend a physical every year.  If you have an ongoing health condition, like diabetes or high blood pressure, your doctor will probably want to see you more often.  What happens during a physical? -- In general, each visit will include:   Physical exam - The doctor or nurse will check your height, weight, heart rate, and blood pressure. They will also look at your eyes and ears. They will ask about how you are feeling and whether you have any symptoms that bother you.   Medicines - It's a good idea to bring a list of all the medicines you take to each doctor visit. Your doctor will talk to you about your medicines and answer any questions. Tell them if you are having any side effects that bother you. You " "should also tell them if you are having trouble paying for any of your medicines.   Habits and behaviors - This includes:   Your diet   Your exercise habits   Whether you smoke, drink alcohol, or use drugs   Whether you are sexually active   Whether you feel safe at home  Your doctor will talk to you about things you can do to improve your health and lower your risk of health problems. They will also offer help and support. For example, if you want to quit smoking, they can give you advice and might prescribe medicines. If you want to improve your diet or get more physical activity, they can help you with this, too.   Lab tests, if needed - The tests you get will depend on your age and situation. For example, your doctor might want to check your:   Cholesterol   Blood sugar   Iron level   Vaccines - The recommended vaccines will depend on your age, health, and what vaccines you already had. Vaccines are very important because they can prevent certain serious or deadly infections.   Discussion of screening - \"Screening\" means checking for diseases or other health problems before they cause symptoms. Your doctor can recommend screening based on your age, risk, and preferences. This might include tests to check for:   Cancer, such as breast, prostate, cervical, ovarian, colorectal, prostate, lung, or skin cancer   Sexually transmitted infections, such as chlamydia and gonorrhea   Mental health conditions like depression and anxiety  Your doctor will talk to you about the different types of screening tests. They can help you decide which screenings to have. They can also explain what the results might mean.   Answering questions - The physical is a good time to ask the doctor or nurse questions about your health. If needed, they can refer you to other doctors or specialists, too.  Adults older than 65 years often need other care, too. As you get older, your doctor will talk to you about:   How to prevent falling at " home   Hearing or vision tests   Memory testing   How to take your medicines safely   Making sure that you have the help and support you need at home  All topics are updated as new evidence becomes available and our peer review process is complete.  This topic retrieved from [employer] on: May 02, 2024.  Topic 530252 Version 1.0  Release: 32.4.3 - C32.122  © 2024 UpToDate, Inc[employer] and/or its affiliates. All rights reserved.  Consumer Information Use and Disclaimer   Disclaimer: This generalized information is a limited summary of diagnosis, treatment, and/or medication information. It is not meant to be comprehensive and should be used as a tool to help the user understand and/or assess potential diagnostic and treatment options. It does NOT include all information about conditions, treatments, medications, side effects, or risks that may apply to a specific patient. It is not intended to be medical advice or a substitute for the medical advice, diagnosis, or treatment of a health care provider based on the health care provider's examination and assessment of a patient's specific and unique circumstances. Patients must speak with a health care provider for complete information about their health, medical questions, and treatment options, including any risks or benefits regarding use of medications. This information does not endorse any treatments or medications as safe, effective, or approved for treating a specific patient. Up[employer], Inc. and its affiliates disclaim any warranty or liability relating to this information or the use thereof.The use of this information is governed by the Terms of Use, available at https://www.woltersUserEventsuwer.com/en/know/clinical-effectiveness-terms. 2024© Up[employer]Date, Inc. and its affiliates and/or licensors. All rights reserved.  Copyright   © 2024 [employer]Date, Inc. and/or its affiliates. All rights reserved.    Patient Education     Mediterranean diet   The Basics   Written by the doctors and  editors at [employer]   What is a Mediterranean diet? -- A Mediterranean diet is a heart-healthy way of eating. It includes foods and cooking styles from many countries around the [place], like [place] and [place]. The exact foods included vary from place to place.  A Mediterranean diet involves eating a lot of fruits, vegetables, nuts, and whole grains. It uses olive oil instead of other fats. It also includes some fish, poultry, and dairy products, but not a lot of red meat.  Wine is often thought of as part of a Mediterranean diet. It is not needed, and you might choose not to include it. If you do drink alcohol, limit the amount to:   For females, no more than 1 drink a day   For males, no more than 2 drinks a day  What are the benefits of a Mediterranean diet? -- A Mediterranean diet can help you:   Improve your overall health, and help you lose weight   Lower your risk of stroke   Lower your risk of heart problems such as a heart attack   Manage your blood sugar if you have diabetes  What can I eat and drink on a Mediterranean diet? -- A Mediterranean diet is more of an eating pattern than a strict diet. Try to cover two-thirds of your plate with fresh fruits and vegetables. Some examples of foods that are often part of this pattern:   Grains - Whole grains like whole-grain bread and pasta, oats, couscous, brown rice, barley, and orzo.   Fruits - Many kinds and colors of fresh, frozen, dried, or canned fruits. Frozen or canned fruits with 100% fruit juice or water (without added sugar). Examples include apples, pears, berries, melons, bananas, plums, raisins, figs, and peaches.   Vegetables - Many kinds and colors of fresh, frozen, or canned vegetables. If canned, low sodium or salt free. If frozen, without added fat and sodium. Examples include avocados, peppers, tomatoes, spinach, kale, beans, carrots, peas, olives, cucumbers, hummus, soybeans, lentils, and kidney beans.   Dairy - Low-fat milk, cheese,  and other dairy products. Greek yogurt, kefir, and plant-based milk alternatives like soy milk.   Lean meats, poultry, seafood, and proteins - [place], tuna, cod, and other fish. Shrimp, clams, scallops, and mussels. White meat chicken and turkey, eggs, dried beans, lentils, and tofu. Nuts such as walnuts, almonds, pecans, hazelnuts, cashews, peanuts, and nut butters. Seeds such as pumpkin, sesame, flax, and sunflower seeds.   Fats, oils, and other foods - Foods with healthy fats found in fish, nuts, and avocados. Olive oil or vegetable oils such as canola oil. Use onions, garlic, spices, and herbs to season food.  What foods and drinks should I avoid or limit on a Mediterranean diet? -- A Mediterranean diet involves avoiding or limiting certain types of foods. Try to avoid foods with additives like artificial sweeteners. Avoid foods that are processed, refined, or preserved. These are often foods with a very long shelf life.   Grains to avoid - White bread, pasta, white rice, crackers, and biscuits.   Fruits to avoid - Fruits canned or frozen with extra sugar.   Vegetables to avoid - Commercially prepared potatoes and vegetable mixes, regular canned vegetables and juices, and vegetables frozen with sauce or pickled vegetables.   Dairy to avoid - Whole-fat dairy products like cheese, ice cream, whole milk, cream, and buttermilk.   Lean meats, poultry, seafood, and proteins to avoid - Red meat such as beef, pork, and lamb. Processed meats such as sausages, deli meats, salami, hot dogs, and pina.   Fats, oils, and other foods to avoid - Butter, margarine, lard, gravies, sauces, and salad dressing. Cookies, cakes, candy, doughnuts, muffins, and other sweets.  All topics are updated as new evidence becomes available and our peer review process is complete.  This topic retrieved from [employer] on: Apr 04, 2024.  Topic 727367 Version 2.0  Release: 32.2.4 - C32.93  © 2024 [employer]Date, Inc. and/or its affiliates. All rights  reserved.  Consumer Information Use and Disclaimer   Disclaimer: This generalized information is a limited summary of diagnosis, treatment, and/or medication information. It is not meant to be comprehensive and should be used as a tool to help the user understand and/or assess potential diagnostic and treatment options. It does NOT include all information about conditions, treatments, medications, side effects, or risks that may apply to a specific patient. It is not intended to be medical advice or a substitute for the medical advice, diagnosis, or treatment of a health care provider based on the health care provider's examination and assessment of a patient's specific and unique circumstances. Patients must speak with a health care provider for complete information about their health, medical questions, and treatment options, including any risks or benefits regarding use of medications. This information does not endorse any treatments or medications as safe, effective, or approved for treating a specific patient. [employer], Inc[employer] and its affiliates disclaim any warranty or liability relating to this information or the use thereof.The use of this information is governed by the Terms of Use, available at https://www.woltersTrewCapuwer.com/en/know/clinical-effectiveness-terms. 2024© [employer], Inc. and its affiliates and/or licensors. All rights reserved.  Copyright   © 2024 [employer], [employer]. and/or its affiliates. All rights reserved.

## 2025-07-22 NOTE — PROGRESS NOTES
Adult Annual Physical  Name: Mckenna Warren      : 2000      MRN: [id number]  Encounter Provider: KARINE Sweeney  Encounter Date: 2025   Encounter department: [facility]    :  Assessment & Plan  Annual physical exam         Lightheaded  Differentials reviewed  Plan today:  Orthostatic BPs stable:Lying-136/80, sitting 126/70, standing 122/80  Heart rate stable/regular  EKG in office today:  Normal sinus rhythm with sinus arrhythmia; normal ECG   Check labs-CBC, CMP, TSH  Evaluate cardiac: Holter monitor and echo  Discussed importance of good self-care: Nutrition, hydration, sleep, stress reduction  Follow-up 4 weeks to review above results prior to leaving for school  Return to care sooner with problems or concerns  ER precaution with any acute episodes    Orders:    Comprehensive metabolic panel; Future    CBC and differential; Future    TSH, 3rd generation with Free T4 reflex; Future    POCT ECG    Echo complete w/ contrast if indicated; Future    Holter monitor; Future        Preventive Screenings:  - Diabetes Screening: orders placed  - Cholesterol Screening: orders placed   - Chlamydia Screening: screening up-to-date   - Hepatitis C screening: screening up-to-date   - HIV screening: screening up-to-date   - Cervical cancer screening: screening up-to-date   - Colon cancer screening: screening not indicated   - Lung cancer screening: screening not indicated     Counseling/Anticipatory Guidance:  - Alcohol: discussed moderation in alcohol intake and recommendations for healthy alcohol use.   - Drug use: discussed harms of illicit drug use and how it can negatively impact mental/physical health.   - Tobacco use: discussed harms of tobacco use and management options for quitting.   - Dental health: discussed importance of regular tooth brushing, flossing, and dental visits.   - Sexual health: discussed sexually transmitted diseases, partner selection, use of condoms,  avoidance of unintended pregnancy, and contraceptive alternatives.   - Diet: discussed recommendations for a healthy/well-balanced diet.   - Exercise: the importance of regular exercise/physical activity was discussed. Recommend exercise 3-5 times per week for at least 30 minutes.   - Injury prevention: discussed safety/seat belts, safety helmets, smoke detectors, carbon monoxide detectors, and smoking near bedding or upholstery.       Depression Screening and Follow-up Plan: Patient was screened for depression during today's encounter. They screened negative with a PHQ-2 score of 0.          History of Present Illness     Adult Annual Physical:  Patient presents for annual physical. Pleasant 24-year-old female presents today for her annual wellness exam  Acute concern: Has been having episodes of feeling dizzy and lightheaded with exercise/increased activity.  Notes it has been happening for the last several months but feels it is becoming more frequent over the last few weeks.  Describes getting overheated/flushed, and feeling almost a tunnel vision coming on but she does not lose her vision.  But does feel as if she is going to pass out if she does not sit or hold onto something.  Can worsen when going from standing to squatting and vice versa.  Does admit to palpitations, denies any chest pain or pressure.  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  Returns to campus in August.  Orders for acute concern placed today; age-appropriate regular preventative care and screenings discussed and ordered as well            .     Diet and Physical Activity:  - Diet/Nutrition: no special diet. Recommend Mediterranean style diet  - Exercise: walking, 3-4 times a week on average and strength training exercises.    Depression Screening:  - PHQ-2 Score: 0    General Health:  - Sleep: sleeps well and 7-8 hours of sleep on average.  - Hearing: normal hearing bilateral ears.  - Vision: wears  "contacts.  - Dental: regular dental visits.    /GYN Health:  - Follows with GYN: yes.   - Menopause: premenopausal.   - Last menstrual cycle: 7/9/2025.   - History of STDs: no  - Contraception:. Women's health up-to-date      Advanced Care Planning:  - Has an advanced directive?: no    - Has a durable medical POA?: no    - ACP document given to patient?: yes      Review of Systems   Constitutional:  Positive for fatigue. Negative for activity change and appetite change.   Respiratory: Negative.     Cardiovascular:  Positive for palpitations. Negative for chest pain.   Gastrointestinal: Negative.    Endocrine: Negative.    Genitourinary: Negative.    Neurological:  Positive for dizziness and light-headedness.   Psychiatric/Behavioral: Negative.           Objective   /80 Comment: lying  BP- 136/80,sitting  /70,Standing  /80  Pulse 64   Temp 98 °F (36.7 °C) (Temporal)   Ht 5' 4.25\" (1.632 m)   Wt 49.2 kg (108 lb 6.4 oz)   LMP 07/09/2025 (Exact Date)   SpO2 99%   BMI 18.46 kg/m²     Physical Exam  Vitals and nursing note reviewed.   Constitutional:       General: She is not in acute distress.     Appearance: Normal appearance. She is well-developed and well-groomed. She is not ill-appearing.   HENT:      Head: Normocephalic.      Right Ear: Tympanic membrane, ear canal and external ear normal.      Left Ear: Tympanic membrane, ear canal and external ear normal.      Nose: Nose normal.      Mouth/Throat:      Mouth: Mucous membranes are moist.      Pharynx: Oropharynx is clear.     Eyes:      Conjunctiva/sclera: Conjunctivae normal.      Pupils: Pupils are equal, round, and reactive to light.     Neck:      Thyroid: No thyromegaly.      Vascular: No carotid bruit.     Cardiovascular:      Rate and Rhythm: Normal rate and regular rhythm.      Pulses:           Posterior tibial pulses are 2+ on the right side and 2+ on the left side.      Heart sounds: Normal heart sounds.   Pulmonary:      " Effort: Pulmonary effort is normal. No respiratory distress.      Breath sounds: Normal breath sounds and air entry.   Abdominal:      General: Bowel sounds are normal.      Palpations: Abdomen is soft.      Tenderness: There is no abdominal tenderness.     Musculoskeletal:      Right lower leg: No edema.      Left lower leg: No edema.   Lymphadenopathy:      Cervical: No cervical adenopathy.     Skin:     General: Skin is warm and dry.     Neurological:      General: No focal deficit present.      Mental Status: She is alert and oriented to person, place, and time.     Psychiatric:         Attention and Perception: Attention normal.         Mood and Affect: Mood normal.         Behavior: Behavior normal.         Thought Content: Thought content normal.         Judgment: Judgment normal.

## 2025-07-23 ENCOUNTER — APPOINTMENT (OUTPATIENT)
Dept: LAB | Facility: MEDICAL CENTER | Age: 25
End: 2025-07-23
Payer: COMMERCIAL

## 2025-07-23 DIAGNOSIS — R42 LIGHTHEADED: ICD-10-CM

## 2025-07-23 LAB
ALBUMIN SERPL BCG-MCNC: 4.2 G/DL (ref 3.5–5)
ALP SERPL-CCNC: 47 U/L (ref 34–104)
ALT SERPL W P-5'-P-CCNC: 13 U/L (ref 7–52)
ANION GAP SERPL CALCULATED.3IONS-SCNC: 9 MMOL/L (ref 4–13)
AST SERPL W P-5'-P-CCNC: 20 U/L (ref 13–39)
BASOPHILS # BLD AUTO: 0.05 THOUSANDS/ÂΜL (ref 0–0.1)
BASOPHILS NFR BLD AUTO: 1 % (ref 0–1)
BILIRUB SERPL-MCNC: 0.39 MG/DL (ref 0.2–1)
BUN SERPL-MCNC: 10 MG/DL (ref 5–25)
CALCIUM SERPL-MCNC: 9.5 MG/DL (ref 8.4–10.2)
CHLORIDE SERPL-SCNC: 103 MMOL/L (ref 96–108)
CO2 SERPL-SCNC: 25 MMOL/L (ref 21–32)
CREAT SERPL-MCNC: 0.56 MG/DL (ref 0.6–1.3)
EOSINOPHIL # BLD AUTO: 0.07 THOUSAND/ÂΜL (ref 0–0.61)
EOSINOPHIL NFR BLD AUTO: 1 % (ref 0–6)
ERYTHROCYTE [DISTWIDTH] IN BLOOD BY AUTOMATED COUNT: 11.7 % (ref 11.6–15.1)
GFR SERPL CREATININE-BSD FRML MDRD: 132 ML/MIN/1.73SQ M
GLUCOSE SERPL-MCNC: 124 MG/DL (ref 65–140)
HCT VFR BLD AUTO: 40.4 % (ref 34.8–46.1)
HGB BLD-MCNC: 13.7 G/DL (ref 11.5–15.4)
IMM GRANULOCYTES # BLD AUTO: 0.04 THOUSAND/UL (ref 0–0.2)
IMM GRANULOCYTES NFR BLD AUTO: 1 % (ref 0–2)
LYMPHOCYTES # BLD AUTO: 2.15 THOUSANDS/ÂΜL (ref 0.6–4.47)
LYMPHOCYTES NFR BLD AUTO: 26 % (ref 14–44)
MCH RBC QN AUTO: 30.2 PG (ref 26.8–34.3)
MCHC RBC AUTO-ENTMCNC: 33.9 G/DL (ref 31.4–37.4)
MCV RBC AUTO: 89 FL (ref 82–98)
MONOCYTES # BLD AUTO: 0.72 THOUSAND/ÂΜL (ref 0.17–1.22)
MONOCYTES NFR BLD AUTO: 9 % (ref 4–12)
NEUTROPHILS # BLD AUTO: 5.19 THOUSANDS/ÂΜL (ref 1.85–7.62)
NEUTS SEG NFR BLD AUTO: 62 % (ref 43–75)
NRBC BLD AUTO-RTO: 0 /100 WBCS
PLATELET # BLD AUTO: 307 THOUSANDS/UL (ref 149–390)
PMV BLD AUTO: 11.1 FL (ref 8.9–12.7)
POTASSIUM SERPL-SCNC: 4 MMOL/L (ref 3.5–5.3)
PROT SERPL-MCNC: 7.1 G/DL (ref 6.4–8.4)
RBC # BLD AUTO: 4.53 MILLION/UL (ref 3.81–5.12)
SODIUM SERPL-SCNC: 137 MMOL/L (ref 135–147)
TSH SERPL DL<=0.05 MIU/L-ACNC: 1.08 UIU/ML (ref 0.45–4.5)
WBC # BLD AUTO: 8.22 THOUSAND/UL (ref 4.31–10.16)

## 2025-07-23 PROCEDURE — 84443 ASSAY THYROID STIM HORMONE: CPT

## 2025-07-23 PROCEDURE — 85025 COMPLETE CBC W/AUTO DIFF WBC: CPT

## 2025-07-23 PROCEDURE — 80053 COMPREHEN METABOLIC PANEL: CPT

## 2025-07-23 PROCEDURE — 36415 COLL VENOUS BLD VENIPUNCTURE: CPT

## (undated) DEVICE — 3000CC GUARDIAN II: Brand: GUARDIAN

## (undated) DEVICE — GLOVE INDICATOR PI UNDERGLOVE SZ 6.5 BLUE

## (undated) DEVICE — TUBING SUCTION 5MM X 12 FT

## (undated) DEVICE — INTENDED FOR TISSUE SEPARATION, AND OTHER PROCEDURES THAT REQUIRE A SHARP SURGICAL BLADE TO PUNCTURE OR CUT.: Brand: BARD-PARKER ® CARBON RIB-BACK BLADES

## (undated) DEVICE — ELECTRODE NEEDLE MOD E-Z CLEAN 2.75IN 7CM -0013M

## (undated) DEVICE — STERILE MANDIBLE PACK: Brand: CARDINAL HEALTH

## (undated) DEVICE — GLOVE SRG BIOGEL ECLIPSE 6.5